# Patient Record
Sex: MALE | Race: WHITE | NOT HISPANIC OR LATINO | Employment: FULL TIME | URBAN - METROPOLITAN AREA
[De-identification: names, ages, dates, MRNs, and addresses within clinical notes are randomized per-mention and may not be internally consistent; named-entity substitution may affect disease eponyms.]

---

## 2018-01-29 ENCOUNTER — TELEPHONE (OUTPATIENT)
Dept: INTERNAL MEDICINE | Facility: CLINIC | Age: 53
End: 2018-01-29

## 2018-01-29 ENCOUNTER — OFFICE VISIT (OUTPATIENT)
Dept: INTERNAL MEDICINE | Facility: CLINIC | Age: 53
End: 2018-01-29
Payer: COMMERCIAL

## 2018-01-29 VITALS
DIASTOLIC BLOOD PRESSURE: 80 MMHG | SYSTOLIC BLOOD PRESSURE: 100 MMHG | HEART RATE: 69 BPM | HEIGHT: 74 IN | BODY MASS INDEX: 29.64 KG/M2 | WEIGHT: 230.94 LBS

## 2018-01-29 DIAGNOSIS — Z00.00 ROUTINE GENERAL MEDICAL EXAMINATION AT A HEALTH CARE FACILITY: Primary | ICD-10-CM

## 2018-01-29 DIAGNOSIS — I10 ESSENTIAL HYPERTENSION: ICD-10-CM

## 2018-01-29 DIAGNOSIS — D68.51 FACTOR V LEIDEN: Primary | ICD-10-CM

## 2018-01-29 DIAGNOSIS — D68.51 FACTOR V LEIDEN: ICD-10-CM

## 2018-01-29 DIAGNOSIS — E78.00 HYPERCHOLESTEROLEMIA: ICD-10-CM

## 2018-01-29 PROCEDURE — 99386 PREV VISIT NEW AGE 40-64: CPT | Mod: S$GLB,,, | Performed by: INTERNAL MEDICINE

## 2018-01-29 PROCEDURE — 99999 PR PBB SHADOW E&M-NEW PATIENT-LVL III: CPT | Mod: PBBFAC,,, | Performed by: INTERNAL MEDICINE

## 2018-01-29 RX ORDER — RIVAROXABAN 20 MG/1
TABLET, FILM COATED ORAL
Qty: 90 TABLET | Refills: 3 | Status: SHIPPED | OUTPATIENT
Start: 2018-01-29 | End: 2018-10-24 | Stop reason: SDUPTHER

## 2018-01-29 RX ORDER — RIVAROXABAN 20 MG/1
TABLET, FILM COATED ORAL
Refills: 1 | COMMUNITY
Start: 2017-12-26 | End: 2018-01-29 | Stop reason: SDUPTHER

## 2018-01-29 RX ORDER — ATORVASTATIN CALCIUM 10 MG/1
TABLET, FILM COATED ORAL
COMMUNITY
Start: 2018-01-23 | End: 2019-02-25 | Stop reason: SDUPTHER

## 2018-01-29 RX ORDER — IRBESARTAN 75 MG/1
TABLET ORAL
COMMUNITY
Start: 2018-01-23 | End: 2019-01-25 | Stop reason: SDUPTHER

## 2018-01-29 NOTE — PROGRESS NOTES
"  HPI:  Anselmo Nava is a 52 y.o. year old male that  presents with   Chief Complaint   Patient presents with    Annual Exam     New Patient   .       No past medical history on file.  Social History     Social History    Marital status:      Spouse name: N/A    Number of children: N/A    Years of education: N/A     Occupational History    Not on file.     Social History Main Topics    Smoking status: Never Smoker    Smokeless tobacco: Never Used    Alcohol use Yes    Drug use: No    Sexual activity: Yes     Partners: Female     Other Topics Concern    Not on file     Social History Narrative    No narrative on file     No past surgical history on file.  No family history on file.        Patient known to me from the [facility].  Patient has factor V Leiden.  He sees a hematologist on a regular basis.  Patient is overdue for lab work.  Patient is up-to-date on colonoscopy having it done within the past 2 years.  Patient has episodes with palpitations.  These episodes seem to be the day after consuming a significant amount of alcohol.  Discussed with patient that this is likely the cause.  Patient had actually even gone to the emergency department and had full workup including tilt table and other evaluation.  He is told that it may be related to anxiety.        The patient is not eligible for Health Maintenance    Review of Systems  Review of Systems   Constitutional: Negative for fever.   Respiratory: Negative for shortness of breath.    Cardiovascular: Negative for chest pain.   Gastrointestinal: Negative for abdominal pain.         Physical Exam:  /80 (BP Location: Right arm, Patient Position: Sitting, BP Method: Medium (Manual))   Pulse 69   Ht 6' 2" (1.88 m)   Wt 104.8 kg (230 lb 14.9 oz)   BMI 29.65 kg/m²   Physical Exam  Vital Signs: Reviewed  General:  No apparent distress  Head:  No signs of head trauma  Eyes:  Pupils are equal.  Extraocular motions intact.  Normal " conjunctiva.  Ears:  Hearing grossly intact.  Neck:  Supple.  No carotid bruit.  No thyromegaly.  Chest:  Clear breath sounds bilaterally.  No wheezes bilaterally.  Non-labored respirations.  Symmetrical expansion.  Cardiac:  Normal rate.  Normal rhythmn.  S1 and S2.  Musculoskeletal:  Normal gait.  Extremities without clubbing or cyanosis.  Neuro:  Alert & oriented X 3.  Speech normal.   Follows commands.  Psychiatric:  Mood normal.  Skin:  No obvious rash or lesions.    LABS:    No results found for this or any previous visit (from the past 2016 hour(s)).    Imaging:  Imaging Results    None           Assessment:    ICD-10-CM ICD-9-CM    1. Routine general medical examination at a health care facility Z00.00 V70.0 Comprehensive metabolic panel      Lipid panel      TSH      URINALYSIS      PSA, Screening   2. Essential hypertension I10 401.9 URINALYSIS   3. Factor V Leiden D68.51 289.81    4. Hypercholesterolemia E78.00 272.0 Comprehensive metabolic panel      Lipid panel      TSH     The primary encounter diagnosis was Routine general medical examination at a health care facility. Diagnoses of Essential hypertension, Factor V Leiden, and Hypercholesterolemia were also pertinent to this visit.      Plan:  Orders Placed This Encounter   Procedures    Comprehensive metabolic panel    Lipid panel    TSH    URINALYSIS    PSA, Screening           Noel De Jesus MD

## 2018-01-29 NOTE — TELEPHONE ENCOUNTER
----- Message from Noel De Jesus MD sent at 1/29/2018  3:17 PM CST -----  He called and stated he needed auth for the Xarelto, can we get that done?  DX is Factor V Leiden    thanks

## 2018-01-31 ENCOUNTER — LAB VISIT (OUTPATIENT)
Dept: LAB | Facility: OTHER | Age: 53
End: 2018-01-31
Attending: INTERNAL MEDICINE
Payer: COMMERCIAL

## 2018-01-31 DIAGNOSIS — E78.00 HYPERCHOLESTEROLEMIA: ICD-10-CM

## 2018-01-31 DIAGNOSIS — Z00.00 ROUTINE GENERAL MEDICAL EXAMINATION AT A HEALTH CARE FACILITY: ICD-10-CM

## 2018-01-31 LAB
ALBUMIN SERPL BCP-MCNC: 4 G/DL
ALP SERPL-CCNC: 67 U/L
ALT SERPL W/O P-5'-P-CCNC: 28 U/L
ANION GAP SERPL CALC-SCNC: 9 MMOL/L
AST SERPL-CCNC: 27 U/L
BILIRUB SERPL-MCNC: 0.8 MG/DL
BUN SERPL-MCNC: 10 MG/DL
CALCIUM SERPL-MCNC: 9.7 MG/DL
CHLORIDE SERPL-SCNC: 105 MMOL/L
CHOLEST SERPL-MCNC: 181 MG/DL
CHOLEST/HDLC SERPL: 2.4 {RATIO}
CO2 SERPL-SCNC: 30 MMOL/L
COMPLEXED PSA SERPL-MCNC: 0.69 NG/ML
CREAT SERPL-MCNC: 0.9 MG/DL
EST. GFR  (AFRICAN AMERICAN): >60 ML/MIN/1.73 M^2
EST. GFR  (NON AFRICAN AMERICAN): >60 ML/MIN/1.73 M^2
GLUCOSE SERPL-MCNC: 109 MG/DL
HDLC SERPL-MCNC: 74 MG/DL
HDLC SERPL: 40.9 %
LDLC SERPL CALC-MCNC: 88.2 MG/DL
NONHDLC SERPL-MCNC: 107 MG/DL
POTASSIUM SERPL-SCNC: 4.4 MMOL/L
PROT SERPL-MCNC: 7 G/DL
SODIUM SERPL-SCNC: 144 MMOL/L
TRIGL SERPL-MCNC: 94 MG/DL
TSH SERPL DL<=0.005 MIU/L-ACNC: 2.19 UIU/ML

## 2018-01-31 PROCEDURE — 84153 ASSAY OF PSA TOTAL: CPT

## 2018-01-31 PROCEDURE — 80053 COMPREHEN METABOLIC PANEL: CPT

## 2018-01-31 PROCEDURE — 36415 COLL VENOUS BLD VENIPUNCTURE: CPT

## 2018-01-31 PROCEDURE — 80061 LIPID PANEL: CPT

## 2018-01-31 PROCEDURE — 84443 ASSAY THYROID STIM HORMONE: CPT

## 2018-05-18 DIAGNOSIS — Z12.11 COLON CANCER SCREENING: ICD-10-CM

## 2018-05-18 DIAGNOSIS — Z11.59 NEED FOR HEPATITIS C SCREENING TEST: ICD-10-CM

## 2018-05-31 ENCOUNTER — TELEPHONE (OUTPATIENT)
Dept: INTERNAL MEDICINE | Facility: CLINIC | Age: 53
End: 2018-05-31

## 2018-05-31 NOTE — TELEPHONE ENCOUNTER
"----- Message from Tangela Sahu sent at 5/31/2018  3:59 PM CDT -----  Contact: Candice Nava Spouse             Name of Who is Calling: Candice Nava Spouse       What is the request in detail: Pt wife says that she calling to follow up on phone call during this past weekend with Dr. De Jesus regarding pt having the same issues and would like to know if pt should be seen by someone.  Pt wife is "only wanting to speak with Dr. De Jesus and don't want to repeat the same information with someone new".  Please contact pt wife to further discuss and advise.    [facility]    What Number to Call Back if not in [facility]: [phone]                                        "

## 2018-05-31 NOTE — TELEPHONE ENCOUNTER
Pt wife only want to speak to Dr. De Jesus denied to give me any information, I informed pt wife that doctor is not in clinic and due to the time her phone call wont be returned until next clinic day. Offer to send message to another provider pt wife denied that option. Also informed pt wife that if she unable to wait for an called back she can  go to ED or UC. Pt wife stated that she will reach out to doctor on his cell phone.

## 2018-06-01 ENCOUNTER — LAB VISIT (OUTPATIENT)
Dept: LAB | Facility: OTHER | Age: 53
End: 2018-06-01
Attending: INTERNAL MEDICINE
Payer: COMMERCIAL

## 2018-06-01 ENCOUNTER — OFFICE VISIT (OUTPATIENT)
Dept: INTERNAL MEDICINE | Facility: CLINIC | Age: 53
End: 2018-06-01
Attending: INTERNAL MEDICINE
Payer: COMMERCIAL

## 2018-06-01 VITALS
SYSTOLIC BLOOD PRESSURE: 134 MMHG | HEART RATE: 80 BPM | HEIGHT: 74 IN | DIASTOLIC BLOOD PRESSURE: 82 MMHG | WEIGHT: 226.44 LBS | BODY MASS INDEX: 29.06 KG/M2

## 2018-06-01 DIAGNOSIS — F32.A ANXIETY AND DEPRESSION: ICD-10-CM

## 2018-06-01 DIAGNOSIS — R41.0 CONFUSION: ICD-10-CM

## 2018-06-01 DIAGNOSIS — R45.851 SUICIDAL THOUGHTS: ICD-10-CM

## 2018-06-01 DIAGNOSIS — F41.9 ANXIETY AND DEPRESSION: ICD-10-CM

## 2018-06-01 DIAGNOSIS — Z11.59 NEED FOR HEPATITIS C SCREENING TEST: ICD-10-CM

## 2018-06-01 DIAGNOSIS — R41.0 CONFUSION: Primary | ICD-10-CM

## 2018-06-01 LAB
ALBUMIN SERPL BCP-MCNC: 4.2 G/DL
ALP SERPL-CCNC: 57 U/L
ALT SERPL W/O P-5'-P-CCNC: 35 U/L
ANION GAP SERPL CALC-SCNC: 9 MMOL/L
AST SERPL-CCNC: 29 U/L
BILIRUB SERPL-MCNC: 0.7 MG/DL
BUN SERPL-MCNC: 16 MG/DL
CALCIUM SERPL-MCNC: 9.9 MG/DL
CHLORIDE SERPL-SCNC: 105 MMOL/L
CO2 SERPL-SCNC: 26 MMOL/L
CREAT SERPL-MCNC: 1 MG/DL
EST. GFR  (AFRICAN AMERICAN): >60 ML/MIN/1.73 M^2
EST. GFR  (NON AFRICAN AMERICAN): >60 ML/MIN/1.73 M^2
FOLATE SERPL-MCNC: 16 NG/ML
GLUCOSE SERPL-MCNC: 110 MG/DL
POTASSIUM SERPL-SCNC: 4 MMOL/L
PROT SERPL-MCNC: 7.3 G/DL
SODIUM SERPL-SCNC: 140 MMOL/L
TSH SERPL DL<=0.005 MIU/L-ACNC: 1.24 UIU/ML
VIT B12 SERPL-MCNC: >2000 PG/ML

## 2018-06-01 PROCEDURE — 84425 ASSAY OF VITAMIN B-1: CPT

## 2018-06-01 PROCEDURE — 99214 OFFICE O/P EST MOD 30 MIN: CPT | Mod: S$GLB,,, | Performed by: INTERNAL MEDICINE

## 2018-06-01 PROCEDURE — 82607 VITAMIN B-12: CPT

## 2018-06-01 PROCEDURE — 82746 ASSAY OF FOLIC ACID SERUM: CPT

## 2018-06-01 PROCEDURE — 3079F DIAST BP 80-89 MM HG: CPT | Mod: CPTII,S$GLB,, | Performed by: INTERNAL MEDICINE

## 2018-06-01 PROCEDURE — 80053 COMPREHEN METABOLIC PANEL: CPT

## 2018-06-01 PROCEDURE — 84443 ASSAY THYROID STIM HORMONE: CPT

## 2018-06-01 PROCEDURE — 86803 HEPATITIS C AB TEST: CPT

## 2018-06-01 PROCEDURE — 99999 PR PBB SHADOW E&M-EST. PATIENT-LVL III: CPT | Mod: PBBFAC,,, | Performed by: INTERNAL MEDICINE

## 2018-06-01 PROCEDURE — 3008F BODY MASS INDEX DOCD: CPT | Mod: CPTII,S$GLB,, | Performed by: INTERNAL MEDICINE

## 2018-06-01 PROCEDURE — 3075F SYST BP GE 130 - 139MM HG: CPT | Mod: CPTII,S$GLB,, | Performed by: INTERNAL MEDICINE

## 2018-06-01 RX ORDER — ESCITALOPRAM OXALATE 10 MG/1
10 TABLET ORAL DAILY
Qty: 90 TABLET | Refills: 1 | Status: SHIPPED | OUTPATIENT
Start: 2018-06-01 | End: 2018-11-20 | Stop reason: SDUPTHER

## 2018-06-01 RX ORDER — HYDROXYZINE HYDROCHLORIDE 25 MG/1
25 TABLET, FILM COATED ORAL 3 TIMES DAILY
Qty: 30 TABLET | Refills: 0 | Status: SHIPPED | OUTPATIENT
Start: 2018-06-01 | End: 2020-01-30

## 2018-06-01 NOTE — PROGRESS NOTES
"Subjective:       Patient ID: Anselmo Nava is a 52 y.o. male.    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 It was the end of his son's baseball career. He admits to drinking more EtOH than normal during the entire week. He found himself to be quite emotional on the drive home finding himself having uncontrollable crying. He reports a 24-36 hour period of sucicidal thoughts. He reports intrusive thoughts such as "what if I went to [employer] ... and shot myself" or "drove my car off this bridge" He denies any similar previous thoughts. He denies continuation of symptoms or similar thoughts. He is quite surprised by these uncontrollable thoughts and feels that these are very uncharacteristic of his mood or thought process. He admits to drinking EtOH most days of the week but denies underlying anxiety or depressive symptoms that he may be self medicating. He reports an episode following episode of suicidal thoughts were he was tying a tie and did the first step but became confused and had his sone tie his tie for him. He denies focal motor weakness, tremors, blurry vision, myelopathic symptoms. He denies preceding memory loss, rigidity, tremors, difficulty with gait, hallucinations. He denies any family members reporting to him any type of change in personality or behavior prior to this event. Family hx of Aunt who committed suicide. Has upcoming appt with Dr. Chen            Review of Systems    Objective:      Vitals:    06/01/18 1056   BP: 134/82   Pulse: 80   Weight: 102.7 kg (226 lb 6.6 oz)   Height: 6' 2" (1.88 m)      Physical Exam   Constitutional: He is oriented to person, place, and time. He appears well-developed and well-nourished. No distress.   HENT:   Head: Normocephalic and atraumatic.   Mouth/Throat: Oropharynx is clear and moist. No oropharyngeal exudate.   Eyes: Conjunctivae and EOM are " normal. Pupils are equal, round, and reactive to light. No scleral icterus.   Neck: No thyromegaly present.   Cardiovascular: Normal rate, regular rhythm and normal heart sounds.    No murmur heard.  Pulmonary/Chest: Effort normal and breath sounds normal. He has no wheezes. He has no rales.   Abdominal: Soft. He exhibits no distension. There is no tenderness.   Musculoskeletal: He exhibits no edema or tenderness.   Lymphadenopathy:     He has no cervical adenopathy.   Neurological: He is alert and oriented to person, place, and time. He has normal strength. No cranial nerve deficit or sensory deficit.   FTN intact. No tremor of outstretched hands. No pronator drift.   Skin: Skin is warm and dry.   Psychiatric: He has a normal mood and affect. His behavior is normal. His speech is not rapid and/or pressured, not tangential and not slurred. He is not agitated, not withdrawn and not actively hallucinating. He does not exhibit a depressed mood. He expresses no homicidal and no suicidal ideation.       Assessment:       1. Confusion    2. Anxiety and depression    3. Suicidal thoughts        Plan:       Anselmo was seen today for anxiety.    Diagnoses and all orders for this visit:    Confusion  -     Vitamin B1; Future  -     Comprehensive metabolic panel; Future  -     TSH; Future  -     Folate; Future  -     Vitamin B12; Future    Anxiety and depression    Suicidal thoughts  -no longer having these thoughts. Was involving a fairly finite time period. Suspect episode brought on by excessive EtOH intake. Continued regular use discussed, especially inlight of these symptoms.  -     hydrOXYzine HCl (ATARAX) 25 MG tablet; Take 1 tablet (25 mg total) by mouth 3 (three) times daily.  -     escitalopram oxalate (LEXAPRO) 10 MG tablet; Take 1 tablet (10 mg total) by mouth once daily.    Addendum (6/22/18 @6201)  Attempted to contact pt to see how he was doing with SI, SSRI, if able tog et psychiatry appt, etc. Per voicemail  he is on vacation. LVM to contact office.         F/U with PCP in 8-12 or sooner prn.      Side effects of medication(s) were discussed in detail and patient voiced understanding.  Patient will call back for any issues or complications.

## 2018-06-04 LAB — HCV AB SERPL QL IA: NEGATIVE

## 2018-06-05 LAB — VIT B1 SERPL-MCNC: 55 UG/L (ref 38–122)

## 2018-06-07 ENCOUNTER — TELEPHONE (OUTPATIENT)
Dept: INTERNAL MEDICINE | Facility: CLINIC | Age: 53
End: 2018-06-07

## 2018-06-14 ENCOUNTER — TELEPHONE (OUTPATIENT)
Dept: INTERNAL MEDICINE | Facility: CLINIC | Age: 53
End: 2018-06-14

## 2018-06-14 NOTE — TELEPHONE ENCOUNTER
lvm for pt to call office back in regards of   Canceling 8/6/18 since dr. De Jesus no longer have clinic.  need to reschedule pt an appt with an new provider as a new pt.

## 2018-07-30 ENCOUNTER — TELEPHONE (OUTPATIENT)
Dept: INTERNAL MEDICINE | Facility: CLINIC | Age: 53
End: 2018-07-30

## 2018-07-30 NOTE — TELEPHONE ENCOUNTER
lvm for pt to call office back in regards of   To reschedule pt appt provider will be out clinic on 8/7/18

## 2018-08-09 ENCOUNTER — PATIENT OUTREACH (OUTPATIENT)
Dept: ADMINISTRATIVE | Facility: HOSPITAL | Age: 53
End: 2018-08-09

## 2018-08-09 NOTE — PROGRESS NOTES
[facility] is committed to your overall health.  To help you get the most out of each of your visits, we will review your information to make sure you are up to date on all of your recommended tests and/or procedures.       Your PCP    found that you may be due for:       Health Maintenance Due   Topic Date Due    TETANUS VACCINE  06/21/1982    Colonoscopy  06/21/2014    Influenza Vaccine  08/01/2018             If you have had any of the above done at another facility, please bring the records or information with you so that your record at [facility] will be complete.  If you would like to schedule any of these, please contact me.     If you are currently taking medication, please bring it with you to your appointment for review.     Also, if you have any type of Advanced Directives, please bring them with you to your office visit so we may scan them into your chart.       Thank you for Choosing [facility] for your healthcare needs.        Additional Information  If you have questions, you can email [email or web address] or call [phone]  to talk to our [facility] staff. Remember, [facility] is NOT to be used for urgent needs. For medical emergencies, dial 911.

## 2018-08-21 ENCOUNTER — OFFICE VISIT (OUTPATIENT)
Dept: INTERNAL MEDICINE | Facility: CLINIC | Age: 53
End: 2018-08-21
Attending: INTERNAL MEDICINE
Payer: COMMERCIAL

## 2018-08-21 VITALS
BODY MASS INDEX: 29.79 KG/M2 | DIASTOLIC BLOOD PRESSURE: 84 MMHG | SYSTOLIC BLOOD PRESSURE: 104 MMHG | OXYGEN SATURATION: 97 % | HEART RATE: 62 BPM | WEIGHT: 232.13 LBS | HEIGHT: 74 IN

## 2018-08-21 DIAGNOSIS — F32.A ANXIETY AND DEPRESSION: ICD-10-CM

## 2018-08-21 DIAGNOSIS — R45.851 SUICIDAL THOUGHTS: ICD-10-CM

## 2018-08-21 DIAGNOSIS — Z86.711 HISTORY OF PULMONARY EMBOLUS (PE): ICD-10-CM

## 2018-08-21 DIAGNOSIS — D68.51 FACTOR V LEIDEN: ICD-10-CM

## 2018-08-21 DIAGNOSIS — E78.00 HYPERCHOLESTEROLEMIA: ICD-10-CM

## 2018-08-21 DIAGNOSIS — Z23 NEED FOR TDAP VACCINATION: Primary | ICD-10-CM

## 2018-08-21 DIAGNOSIS — I10 ESSENTIAL HYPERTENSION: ICD-10-CM

## 2018-08-21 DIAGNOSIS — F41.9 ANXIETY AND DEPRESSION: ICD-10-CM

## 2018-08-21 PROCEDURE — 99999 PR PBB SHADOW E&M-EST. PATIENT-LVL IV: CPT | Mod: PBBFAC,,, | Performed by: INTERNAL MEDICINE

## 2018-08-21 PROCEDURE — 3008F BODY MASS INDEX DOCD: CPT | Mod: CPTII,S$GLB,, | Performed by: INTERNAL MEDICINE

## 2018-08-21 PROCEDURE — 3074F SYST BP LT 130 MM HG: CPT | Mod: CPTII,S$GLB,, | Performed by: INTERNAL MEDICINE

## 2018-08-21 PROCEDURE — 3079F DIAST BP 80-89 MM HG: CPT | Mod: CPTII,S$GLB,, | Performed by: INTERNAL MEDICINE

## 2018-08-21 PROCEDURE — 99214 OFFICE O/P EST MOD 30 MIN: CPT | Mod: S$GLB,,, | Performed by: INTERNAL MEDICINE

## 2018-08-21 NOTE — PROGRESS NOTES
Subjective:       Patient ID: Anselmo Nava is a 53 y.o. male.    Chief Complaint: Follow-up    Here to establish care    No more having episodes of increased stress and worry. He denies SI/HI. He denies any additional episodes of confusion.     Factor V leiden- Diagnosed PE about 5 years prior and sounds like he had thrombolysis done. Follows with cardiologist Princess twice a year along with hematologist jim Simental. On xarelto. Denies bleeding complications. Does report easy bruising but is not bothersome.     HM  Colonoscopy at age 49 with rec to f/u with ENT                Review of Systems   Constitutional: Negative for appetite change, chills, fever and unexpected weight change.   HENT: Negative for hearing loss, sore throat and trouble swallowing.    Eyes: Negative for visual disturbance.   Respiratory: Negative for cough, chest tightness and shortness of breath.    Cardiovascular: Negative for chest pain and leg swelling.   Gastrointestinal: Negative for abdominal pain, blood in stool, constipation, diarrhea, nausea and vomiting.   Endocrine: Negative for polydipsia and polyuria.   Genitourinary: Negative for decreased urine volume, difficulty urinating, dysuria, frequency and urgency.   Musculoskeletal: Negative for gait problem.   Skin: Negative for rash.   Neurological: Negative for dizziness and numbness.   Psychiatric/Behavioral: The patient is not nervous/anxious.        Past Medical History:   Diagnosis Date    Anxiety      History reviewed. No pertinent surgical history.  Family History   Problem Relation Age of Onset    Lymphoma Mother     Depression Sister      Social History     Socioeconomic History    Marital status:      Spouse name: Not on file    Number of children: Not on file    Years of education: Not on file    Highest education level: Not on file   Social Needs    Financial resource strain: Not on file    Food insecurity - worry: Not on file    Food insecurity -  "inability: Not on file    Transportation needs - medical: Not on file    Transportation needs - non-medical: Not on file   Occupational History    Not on file   Tobacco Use    Smoking status: Never Smoker    Smokeless tobacco: Never Used   Substance and Sexual Activity    Alcohol use: Yes    Drug use: No    Sexual activity: Yes     Partners: Female   Other Topics Concern    Not on file   Social History Narrative    Not on file         Objective:      Vitals:    08/21/18 1104   BP: 104/84   Pulse: 62   SpO2: 97%   Weight: 105.3 kg (232 lb 2.3 oz)   Height: 6' 2" (1.88 m)      Physical Exam   Constitutional: He is oriented to person, place, and time. He appears well-developed and well-nourished. No distress.   HENT:   Head: Normocephalic and atraumatic.   Mouth/Throat: Oropharynx is clear and moist. No oropharyngeal exudate.   Eyes: Conjunctivae and EOM are normal. Pupils are equal, round, and reactive to light. No scleral icterus.   Neck: No thyromegaly present.   Cardiovascular: Normal rate, regular rhythm and normal heart sounds.   No murmur heard.  Pulmonary/Chest: Effort normal and breath sounds normal. He has no wheezes. He has no rales.   Abdominal: Soft. He exhibits no distension. There is no tenderness.   Musculoskeletal: He exhibits no edema or tenderness.   Lymphadenopathy:     He has no cervical adenopathy.   Neurological: He is alert and oriented to person, place, and time.   Skin: Skin is warm and dry.   Psychiatric: He has a normal mood and affect. His behavior is normal.       Assessment:       1. Need for Tdap vaccination    2. Suicidal thoughts    3. Anxiety and depression    4. Essential hypertension    5. Factor V Leiden    6. Hypercholesterolemia    7. History of pulmonary embolus (PE)        Plan:       Anselmo was seen today for follow-up.    Diagnoses and all orders for this visit:    Need for Tdap vaccination  Pt given paper Rx to take to our pharmacy down stairs for following " vaccinations:     Suicidal thoughts  Resolved. Isolated episode. Will monitor  Anxiety and depression  Continue lexpro.   Essential hypertension  controlled. Continue current regimen    Factor V Leiden   F/u with heme and continue xarelto  Hypercholesterolemia   Tolerating statin  History of pulmonary embolus (PE)    RTC in 6 months or sooner prn           Side effects of medication(s) were discussed in detail and patient voiced understanding.  Patient will call back for any issues or complications.

## 2018-08-28 ENCOUNTER — DOCUMENTATION ONLY (OUTPATIENT)
Dept: ADMINISTRATIVE | Facility: HOSPITAL | Age: 53
End: 2018-08-28

## 2018-08-28 NOTE — PROGRESS NOTES
PROGRESS NOTES RECEIVED FROM [facility], SCANNED INTO PATIENT CHART CAN BE VIEWED UNDER MEDIA TAB.

## 2018-10-24 DIAGNOSIS — D68.51 FACTOR V LEIDEN: ICD-10-CM

## 2018-11-20 RX ORDER — ESCITALOPRAM OXALATE 10 MG/1
TABLET ORAL
Qty: 90 TABLET | Refills: 2 | Status: SHIPPED | OUTPATIENT
Start: 2018-11-20 | End: 2019-08-21 | Stop reason: SDUPTHER

## 2019-01-15 ENCOUNTER — PATIENT OUTREACH (OUTPATIENT)
Dept: ADMINISTRATIVE | Facility: HOSPITAL | Age: 54
End: 2019-01-15

## 2019-01-15 NOTE — PROGRESS NOTES
Dear Marshall Magee [facility] is committed to your overall health and would like to ensure that you are up to date on all of your health maintenance testing. Our records indicate that you are overdue for your colorectal cancer screening. After reviewing your chart, it has been documented that a FIT KIT (colorectal cancer screening kit) was given at a clinic visit or mailed to you, but the lab has yet to receive the kit so that we may update your chart. This is a friendly reminder to complete this test (the instructions will tell you how) and then return your sample in the postage-paid return envelope within 24 hours of collection or return to ANY [facility] Facility . Please remember to put the collection date on your sample!    If your test results are negative, you won't need testing again for another year. If results show you need more testing, we will call you with next steps.  Also, if you have misplaced the FITKIT let us know and we will mail you another one.  Sincerely,        Mickey Anton MD and your [facility] Team

## 2019-01-15 NOTE — PROGRESS NOTES
[facility] is committed to your overall health.  To help you get the most out of each of your visits, we will review your information to make sure you are up to date on all of your recommended tests and/or procedures.       Your PCP  Mickey Anton MD   found that you may be due for:       Health Maintenance Due   Topic Date Due    TETANUS VACCINE  06/21/1982    Colonoscopy  06/21/2014    Influenza Vaccine  08/01/2018             If you have had any of the above done at another facility, please bring the records or information with you so that your record at [facility] will be complete.  If you would like to schedule any of these, please contact me.     If you are currently taking medication, please bring it with you to your appointment for review.     Also, if you have any type of Advanced Directives, please bring them with you to your office visit so we may scan them into your chart.       Thank you for Choosing [facility] for your healthcare needs.        Additional Information  If you have questions, you can email [email or web address] or call [phone]  to talk to our [facility] staff. Remember, [employer] is NOT to be used for urgent needs. For medical emergencies, dial 911.

## 2019-01-25 RX ORDER — IRBESARTAN 75 MG/1
TABLET ORAL
Qty: 90 TABLET | Refills: 2 | Status: SHIPPED | OUTPATIENT
Start: 2019-01-25 | End: 2019-03-28

## 2019-01-29 ENCOUNTER — OFFICE VISIT (OUTPATIENT)
Dept: INTERNAL MEDICINE | Facility: CLINIC | Age: 54
End: 2019-01-29
Attending: INTERNAL MEDICINE
Payer: COMMERCIAL

## 2019-01-29 ENCOUNTER — LAB VISIT (OUTPATIENT)
Dept: LAB | Facility: OTHER | Age: 54
End: 2019-01-29
Attending: INTERNAL MEDICINE
Payer: COMMERCIAL

## 2019-01-29 VITALS
OXYGEN SATURATION: 96 % | WEIGHT: 237.88 LBS | SYSTOLIC BLOOD PRESSURE: 118 MMHG | HEART RATE: 78 BPM | DIASTOLIC BLOOD PRESSURE: 72 MMHG | HEIGHT: 74 IN | BODY MASS INDEX: 30.53 KG/M2

## 2019-01-29 DIAGNOSIS — E78.00 HYPERCHOLESTEROLEMIA: ICD-10-CM

## 2019-01-29 DIAGNOSIS — D68.51 FACTOR V LEIDEN: ICD-10-CM

## 2019-01-29 DIAGNOSIS — Z00.00 ANNUAL PHYSICAL EXAM: Primary | ICD-10-CM

## 2019-01-29 DIAGNOSIS — Z23 NEED FOR TDAP VACCINATION: ICD-10-CM

## 2019-01-29 DIAGNOSIS — Z12.5 PROSTATE CANCER SCREENING: ICD-10-CM

## 2019-01-29 DIAGNOSIS — Z00.00 ANNUAL PHYSICAL EXAM: ICD-10-CM

## 2019-01-29 DIAGNOSIS — Z12.12 SCREENING FOR COLORECTAL CANCER: ICD-10-CM

## 2019-01-29 DIAGNOSIS — Z12.11 SCREENING FOR COLORECTAL CANCER: ICD-10-CM

## 2019-01-29 DIAGNOSIS — I10 ESSENTIAL HYPERTENSION: ICD-10-CM

## 2019-01-29 LAB
ALBUMIN SERPL BCP-MCNC: 3.9 G/DL
ALP SERPL-CCNC: 65 U/L
ALT SERPL W/O P-5'-P-CCNC: 23 U/L
ANION GAP SERPL CALC-SCNC: 5 MMOL/L
AST SERPL-CCNC: 28 U/L
BASOPHILS # BLD AUTO: 0.02 K/UL
BASOPHILS NFR BLD: 0.5 %
BILIRUB SERPL-MCNC: 0.7 MG/DL
BUN SERPL-MCNC: 12 MG/DL
CALCIUM SERPL-MCNC: 9.4 MG/DL
CHLORIDE SERPL-SCNC: 103 MMOL/L
CHOLEST SERPL-MCNC: 188 MG/DL
CHOLEST/HDLC SERPL: 2.3 {RATIO}
CO2 SERPL-SCNC: 31 MMOL/L
COMPLEXED PSA SERPL-MCNC: 0.59 NG/ML
CREAT SERPL-MCNC: 0.9 MG/DL
DIFFERENTIAL METHOD: ABNORMAL
EOSINOPHIL # BLD AUTO: 0.1 K/UL
EOSINOPHIL NFR BLD: 1.9 %
ERYTHROCYTE [DISTWIDTH] IN BLOOD BY AUTOMATED COUNT: 14 %
EST. GFR  (AFRICAN AMERICAN): >60 ML/MIN/1.73 M^2
EST. GFR  (NON AFRICAN AMERICAN): >60 ML/MIN/1.73 M^2
ESTIMATED AVG GLUCOSE: 117 MG/DL
GLUCOSE SERPL-MCNC: 120 MG/DL
HBA1C MFR BLD HPLC: 5.7 %
HCT VFR BLD AUTO: 44.1 %
HDLC SERPL-MCNC: 82 MG/DL
HDLC SERPL: 43.6 %
HGB BLD-MCNC: 14.8 G/DL
LDLC SERPL CALC-MCNC: 89.8 MG/DL
LYMPHOCYTES # BLD AUTO: 1.3 K/UL
LYMPHOCYTES NFR BLD: 32.3 %
MCH RBC QN AUTO: 33.3 PG
MCHC RBC AUTO-ENTMCNC: 33.6 G/DL
MCV RBC AUTO: 99 FL
MONOCYTES # BLD AUTO: 0.5 K/UL
MONOCYTES NFR BLD: 12 %
NEUTROPHILS # BLD AUTO: 2.2 K/UL
NEUTROPHILS NFR BLD: 53.1 %
NONHDLC SERPL-MCNC: 106 MG/DL
PLATELET # BLD AUTO: 237 K/UL
PMV BLD AUTO: 11 FL
POTASSIUM SERPL-SCNC: 4.2 MMOL/L
PROT SERPL-MCNC: 6.8 G/DL
RBC # BLD AUTO: 4.44 M/UL
SODIUM SERPL-SCNC: 139 MMOL/L
TRIGL SERPL-MCNC: 81 MG/DL
TSH SERPL DL<=0.005 MIU/L-ACNC: 2.1 UIU/ML
WBC # BLD AUTO: 4.15 K/UL

## 2019-01-29 PROCEDURE — 80053 COMPREHEN METABOLIC PANEL: CPT

## 2019-01-29 PROCEDURE — 3074F SYST BP LT 130 MM HG: CPT | Mod: CPTII,S$GLB,, | Performed by: INTERNAL MEDICINE

## 2019-01-29 PROCEDURE — 84153 ASSAY OF PSA TOTAL: CPT

## 2019-01-29 PROCEDURE — 3078F PR MOST RECENT DIASTOLIC BLOOD PRESSURE < 80 MM HG: ICD-10-PCS | Mod: CPTII,S$GLB,, | Performed by: INTERNAL MEDICINE

## 2019-01-29 PROCEDURE — 83036 HEMOGLOBIN GLYCOSYLATED A1C: CPT

## 2019-01-29 PROCEDURE — 99396 PREV VISIT EST AGE 40-64: CPT | Mod: S$GLB,,, | Performed by: INTERNAL MEDICINE

## 2019-01-29 PROCEDURE — 99999 PR PBB SHADOW E&M-EST. PATIENT-LVL IV: ICD-10-PCS | Mod: PBBFAC,,, | Performed by: INTERNAL MEDICINE

## 2019-01-29 PROCEDURE — 99999 PR PBB SHADOW E&M-EST. PATIENT-LVL IV: CPT | Mod: PBBFAC,,, | Performed by: INTERNAL MEDICINE

## 2019-01-29 PROCEDURE — 3078F DIAST BP <80 MM HG: CPT | Mod: CPTII,S$GLB,, | Performed by: INTERNAL MEDICINE

## 2019-01-29 PROCEDURE — 80061 LIPID PANEL: CPT

## 2019-01-29 PROCEDURE — 36415 COLL VENOUS BLD VENIPUNCTURE: CPT

## 2019-01-29 PROCEDURE — 3074F PR MOST RECENT SYSTOLIC BLOOD PRESSURE < 130 MM HG: ICD-10-PCS | Mod: CPTII,S$GLB,, | Performed by: INTERNAL MEDICINE

## 2019-01-29 PROCEDURE — 84443 ASSAY THYROID STIM HORMONE: CPT

## 2019-01-29 PROCEDURE — 85025 COMPLETE CBC W/AUTO DIFF WBC: CPT

## 2019-01-29 PROCEDURE — 99396 PR PREVENTIVE VISIT,EST,40-64: ICD-10-PCS | Mod: S$GLB,,, | Performed by: INTERNAL MEDICINE

## 2019-01-29 NOTE — PROGRESS NOTES
"Subjective:       Patient ID: Anselmo Nava is a 53 y.o. male.    Chief Complaint: Follow-up    Here for annual exam    Patient presents today for routine evaluation, physical, and labs. Patient has no major concerns or complaints today.     Mood well controlled with low dose escitalopram.     On ACEi. We will change this.    Pt is tolerating statin without frequent muscle cramps or diffuse myalgias or weakness.    No bleeding complications on xarelto        Review of Systems   Constitutional: Negative for appetite change, chills, fever and unexpected weight change.   HENT: Negative for hearing loss, sore throat and trouble swallowing.    Eyes: Negative for visual disturbance.   Respiratory: Negative for cough, chest tightness and shortness of breath.    Cardiovascular: Negative for chest pain and leg swelling.   Gastrointestinal: Negative for abdominal pain, blood in stool, constipation, diarrhea, nausea and vomiting.   Endocrine: Negative for polydipsia and polyuria.   Genitourinary: Negative for decreased urine volume, difficulty urinating, dysuria, frequency and urgency.   Musculoskeletal: Negative for gait problem.   Skin: Negative for rash.   Neurological: Negative for dizziness and numbness.   Psychiatric/Behavioral: The patient is not nervous/anxious.        Objective:      Vitals:    01/29/19 0821   BP: 118/72   Pulse: 78   SpO2: 96%   Weight: 107.9 kg (237 lb 14 oz)   Height: 6' 2" (1.88 m)      Physical Exam   Constitutional: He is oriented to person, place, and time. He appears well-developed and well-nourished. No distress.   HENT:   Head: Normocephalic and atraumatic.   Mouth/Throat: Oropharynx is clear and moist. No oropharyngeal exudate.   Eyes: Conjunctivae and EOM are normal. Pupils are equal, round, and reactive to light. No scleral icterus.   Neck: No thyromegaly present.   Cardiovascular: Normal rate, regular rhythm and normal heart sounds.   No murmur heard.  Pulmonary/Chest: Effort normal and " "breath sounds normal. He has no wheezes. He has no rales.   Abdominal: Soft. He exhibits no distension. There is no tenderness.   Musculoskeletal: He exhibits no edema or tenderness.   Lymphadenopathy:     He has no cervical adenopathy.   Neurological: He is alert and oriented to person, place, and time.   Skin: Skin is warm and dry.   Psychiatric: He has a normal mood and affect. His behavior is normal.       Assessment:       1. Annual physical exam    2. Essential hypertension    3. Need for Tdap vaccination    4. Screening for colorectal cancer    5. Prostate cancer screening    6. Factor V Leiden    7. Hypercholesterolemia        Plan:       Anselmo was seen today for follow-up.    Diagnoses and all orders for this visit:    Annual physical exam  -     Comprehensive metabolic panel; Future  -     Lipid panel; Future  -     CBC auto differential; Future  -     TSH; Future  -     Hemoglobin A1c; Future    Essential hypertension   .[email or web address]    Need for Tdap vaccination    Prostate cancer screening  -     PSA, Screening; Future    Factor V Leiden   controlled. Continue current regimen    Hypercholesterolemia  Your LDL or "bad cholesterol" is elevated which places you at increased risk for heart attacks and strokes.  I recommend a low cholesterol, low carbohydrate diet and exercise to achieve weight loss. In addition to his I would like to start you on a medication called a statin, which is very effective at lowering your cholesterol. If you are amenable to this plan please let me know and I will send in a prescription to your pharmacy. If while taking you develop severe, frequent muscle aches please stop the medication and notify me or my staff electronically or by phone.    [facility]rose UTD done approx 3-4 yrs prior by [facility]. Will get records.          RTC in 6 months or sooner prn           Side effects of medication(s) were discussed in detail and patient voiced understanding.  Patient will call back for any issues or " complications.

## 2019-02-13 ENCOUNTER — DOCUMENTATION ONLY (OUTPATIENT)
Dept: INTERNAL MEDICINE | Facility: CLINIC | Age: 54
End: 2019-02-13

## 2019-02-13 NOTE — PROGRESS NOTES
Outside records received and reviewed    [facility] 7/7/15  EGD-ring in lower 1/3 esophagus c/ dilation  Erythema and congestion in antrum and duodenal bulb and irregular Z line consistent with esophagitis   Colonoscopy  3mm polkpy distal descending colon   Rec rpt in 5 yrs.    Please let me know when you would like to schedule this by responding to this message and it will go to my support staff. If you have additional questions or concerns they will forward those on to me.

## 2019-02-25 RX ORDER — ATORVASTATIN CALCIUM 10 MG/1
TABLET, FILM COATED ORAL
Qty: 90 TABLET | Refills: 2 | Status: SHIPPED | OUTPATIENT
Start: 2019-02-25 | End: 2019-11-18 | Stop reason: SDUPTHER

## 2019-02-26 NOTE — TELEPHONE ENCOUNTER
----- Message from Rajinder Bloom sent at 2/26/2019  2:00 PM CST -----  Contact: FARSHAD PLASCENCIA [[id number]]  Name of Who is Calling: FARSHAD PLASCENCIA [[id number]]      What is the request in detail: Patient states he needs a prior authorization for rivaroxaban (XARELTO) 20 mg Tab. Please call [phone]      Can the clinic reply by [facility]: no      What [facility] Back [facility]: [phone]

## 2019-03-20 PROBLEM — K22.2 ESOPHAGEAL RING: Status: ACTIVE | Noted: 2019-03-20

## 2019-03-20 PROBLEM — K64.8 INTERNAL HEMORRHOIDS: Status: ACTIVE | Noted: 2019-03-20

## 2019-03-27 ENCOUNTER — TELEPHONE (OUTPATIENT)
Dept: INTERNAL MEDICINE | Facility: CLINIC | Age: 54
End: 2019-03-27

## 2019-03-27 NOTE — TELEPHONE ENCOUNTER
----- Message from Leatha Cole sent at 3/27/2019  8:41 AM CDT -----  Contact: Pt   Name of Who is Calling: FARSHAD PLASCENCIA [[id number]]      What is the request in detail: Patient is requesting a call from staff, no further details provided for the call. Please contact to further discuss and advise      Can the clinic reply by [facility]: No       What Number to Call Back if not in [facility]: [phone]

## 2019-03-27 NOTE — TELEPHONE ENCOUNTER
----- Message from Jared Diaz sent at 3/27/2019  2:03 PM CDT -----  Contact: FARSHAD PLASCENCIA [[id number]]  Type:  Patient Returning Call    Who Called: FARSHAD PLASCENCIA [[id number]]    Who Left Message for Patient: Taryn Murphy LPN      Does the patient know what this is regarding?: Yes blood pressure medication    Best Call Back Number:[phone]    Additional Information: Pt is requesting to speak with nurse regarding blood pressure medication. Pt states that there has been a recall on one of his recent prescribe meds and and advising on requesting a replacement med. Please advise. Thanks

## 2019-03-28 RX ORDER — VALSARTAN 80 MG/1
80 TABLET ORAL DAILY
Qty: 90 TABLET | Refills: 3 | Status: SHIPPED | OUTPATIENT
Start: 2019-03-28 | End: 2020-03-23

## 2019-03-28 NOTE — TELEPHONE ENCOUNTER
----- Message from Bianka Hodge sent at 3/28/2019 10:20 AM CDT -----  Type:  Patient Returning Call    Who Called: FARSHAD PLASCENCIA [[id number]]      Who Left Message for Patient: Taryn     Does the patient know what this is regarding?:Pt is calling about his medication. This is the 3rd call. Pt states he did not receive a call or voicemail from yesterday     Best Call Back Number:[phone]

## 2019-03-28 NOTE — TELEPHONE ENCOUNTER
lvm notifying pt. That Valsartan sent in to replace recall irbesartan. f/u in 3-4 weeks for nurse visit for BP check. Pt. Can call [phone] to schedule this appointment   Pedro

## 2019-04-08 ENCOUNTER — TELEPHONE (OUTPATIENT)
Dept: INTERNAL MEDICINE | Facility: CLINIC | Age: 54
End: 2019-04-08

## 2019-04-08 NOTE — TELEPHONE ENCOUNTER
Spoke with pt. Regarding xarelto.  Pt. Has another Dr. Refill it. Pt. Would like [facility].

## 2019-07-30 ENCOUNTER — OFFICE VISIT (OUTPATIENT)
Dept: INTERNAL MEDICINE | Facility: CLINIC | Age: 54
End: 2019-07-30
Attending: INTERNAL MEDICINE
Payer: COMMERCIAL

## 2019-07-30 ENCOUNTER — LAB VISIT (OUTPATIENT)
Dept: LAB | Facility: OTHER | Age: 54
End: 2019-07-30
Attending: INTERNAL MEDICINE
Payer: COMMERCIAL

## 2019-07-30 VITALS
HEART RATE: 71 BPM | HEIGHT: 74 IN | SYSTOLIC BLOOD PRESSURE: 120 MMHG | OXYGEN SATURATION: 96 % | BODY MASS INDEX: 30.25 KG/M2 | WEIGHT: 235.69 LBS | DIASTOLIC BLOOD PRESSURE: 74 MMHG

## 2019-07-30 DIAGNOSIS — D68.51 FACTOR V LEIDEN: ICD-10-CM

## 2019-07-30 DIAGNOSIS — I10 ESSENTIAL HYPERTENSION: ICD-10-CM

## 2019-07-30 DIAGNOSIS — R73.03 PRE-DIABETES: Primary | ICD-10-CM

## 2019-07-30 DIAGNOSIS — E78.00 HYPERCHOLESTEROLEMIA: ICD-10-CM

## 2019-07-30 DIAGNOSIS — R73.03 PRE-DIABETES: ICD-10-CM

## 2019-07-30 LAB
ALBUMIN SERPL BCP-MCNC: 4 G/DL (ref 3.5–5.2)
ALP SERPL-CCNC: 64 U/L (ref 55–135)
ALT SERPL W/O P-5'-P-CCNC: 27 U/L (ref 10–44)
ANION GAP SERPL CALC-SCNC: 9 MMOL/L (ref 8–16)
AST SERPL-CCNC: 28 U/L (ref 10–40)
BILIRUB SERPL-MCNC: 0.8 MG/DL (ref 0.1–1)
BUN SERPL-MCNC: 14 MG/DL (ref 6–20)
CALCIUM SERPL-MCNC: 9.5 MG/DL (ref 8.7–10.5)
CHLORIDE SERPL-SCNC: 105 MMOL/L (ref 95–110)
CO2 SERPL-SCNC: 26 MMOL/L (ref 23–29)
CREAT SERPL-MCNC: 0.9 MG/DL (ref 0.5–1.4)
EST. GFR  (AFRICAN AMERICAN): >60 ML/MIN/1.73 M^2
EST. GFR  (NON AFRICAN AMERICAN): >60 ML/MIN/1.73 M^2
ESTIMATED AVG GLUCOSE: 117 MG/DL (ref 68–131)
GLUCOSE SERPL-MCNC: 117 MG/DL (ref 70–110)
HBA1C MFR BLD HPLC: 5.7 % (ref 4–5.6)
POTASSIUM SERPL-SCNC: 3.8 MMOL/L (ref 3.5–5.1)
PROT SERPL-MCNC: 7 G/DL (ref 6–8.4)
SODIUM SERPL-SCNC: 140 MMOL/L (ref 136–145)

## 2019-07-30 PROCEDURE — 3008F PR BODY MASS INDEX (BMI) DOCUMENTED: ICD-10-PCS | Mod: CPTII,S$GLB,, | Performed by: INTERNAL MEDICINE

## 2019-07-30 PROCEDURE — 3074F PR MOST RECENT SYSTOLIC BLOOD PRESSURE < 130 MM HG: ICD-10-PCS | Mod: CPTII,S$GLB,, | Performed by: INTERNAL MEDICINE

## 2019-07-30 PROCEDURE — 3008F BODY MASS INDEX DOCD: CPT | Mod: CPTII,S$GLB,, | Performed by: INTERNAL MEDICINE

## 2019-07-30 PROCEDURE — 99999 PR PBB SHADOW E&M-EST. PATIENT-LVL III: ICD-10-PCS | Mod: PBBFAC,,, | Performed by: INTERNAL MEDICINE

## 2019-07-30 PROCEDURE — 99999 PR PBB SHADOW E&M-EST. PATIENT-LVL III: CPT | Mod: PBBFAC,,, | Performed by: INTERNAL MEDICINE

## 2019-07-30 PROCEDURE — 99214 PR OFFICE/OUTPT VISIT, EST, LEVL IV, 30-39 MIN: ICD-10-PCS | Mod: S$GLB,,, | Performed by: INTERNAL MEDICINE

## 2019-07-30 PROCEDURE — 99214 OFFICE O/P EST MOD 30 MIN: CPT | Mod: S$GLB,,, | Performed by: INTERNAL MEDICINE

## 2019-07-30 PROCEDURE — 3078F DIAST BP <80 MM HG: CPT | Mod: CPTII,S$GLB,, | Performed by: INTERNAL MEDICINE

## 2019-07-30 PROCEDURE — 80053 COMPREHEN METABOLIC PANEL: CPT

## 2019-07-30 PROCEDURE — 3078F PR MOST RECENT DIASTOLIC BLOOD PRESSURE < 80 MM HG: ICD-10-PCS | Mod: CPTII,S$GLB,, | Performed by: INTERNAL MEDICINE

## 2019-07-30 PROCEDURE — 83036 HEMOGLOBIN GLYCOSYLATED A1C: CPT

## 2019-07-30 PROCEDURE — 3074F SYST BP LT 130 MM HG: CPT | Mod: CPTII,S$GLB,, | Performed by: INTERNAL MEDICINE

## 2019-07-30 PROCEDURE — 36415 COLL VENOUS BLD VENIPUNCTURE: CPT

## 2019-07-30 NOTE — PROGRESS NOTES
"Subjective:       Patient ID: Anselmo Nava is a 54 y.o. male.    Chief Complaint: Annual Exam    Here for annual exam    Patient presents today for routine evaluation, physical, and labs. Patient has no major concerns or complaints today.     Wakes at 3am to void but otherwise e denies urinary frequency, urinary urgency, decreased force of stream, incomplete emptying of bladder, post void dribble, nocturia, or gross hematuria.     On Xarelto for Hx of Factor v Leiden. He denies calf pain or swelling, SOB, SAHNI, pleuritic CP, hemoptysis, syncope, significant BRBPR, melena or recurrent epistaxis.     Denies frequent or current HA, intermittent blurry vision, dizziness, CP, or SOB.    Pt is tolerating statin without frequent muscle cramps or diffuse myalgias or weakness.          Review of Systems   Constitutional: Negative for appetite change, chills, fever and unexpected weight change.   HENT: Negative for hearing loss, sore throat and trouble swallowing.    Eyes: Negative for visual disturbance.   Respiratory: Negative for cough, chest tightness and shortness of breath.    Cardiovascular: Negative for chest pain and leg swelling.   Gastrointestinal: Negative for abdominal pain, blood in stool, constipation, diarrhea, nausea and vomiting.   Endocrine: Negative for polydipsia and polyuria.   Genitourinary: Negative for decreased urine volume, difficulty urinating, dysuria, frequency and urgency.   Musculoskeletal: Negative for gait problem.   Skin: Negative for rash.   Neurological: Negative for dizziness and numbness.   Psychiatric/Behavioral: The patient is not nervous/anxious.        Objective:      Vitals:    07/30/19 0842   BP: 120/74   Pulse: 71   SpO2: 96%   Weight: 106.9 kg (235 lb 10.8 oz)   Height: 6' 2" (1.88 m)      Physical Exam   Constitutional: He is oriented to person, place, and time. He appears well-developed and well-nourished. No distress.   HENT:   Head: Normocephalic and atraumatic. "   Mouth/Throat: Oropharynx is clear and moist. No oropharyngeal exudate.   Eyes: Pupils are equal, round, and reactive to light. Conjunctivae and EOM are normal. No scleral icterus.   Neck: No thyromegaly present.   Cardiovascular: Normal rate, regular rhythm and normal heart sounds.   No murmur heard.  Pulmonary/Chest: Effort normal and breath sounds normal. He has no wheezes. He has no rales.   Abdominal: Soft. He exhibits no distension. There is no tenderness.   Musculoskeletal: He exhibits no edema or tenderness.   Lymphadenopathy:     He has no cervical adenopathy.   Neurological: He is alert and oriented to person, place, and time.   Skin: Skin is warm and dry.   Psychiatric: He has a normal mood and affect. His behavior is normal.       Assessment:       1. Pre-diabetes    2. Essential hypertension    3. Factor V Leiden    4. Hypercholesterolemia        Plan:       Anselmo was seen today for annual exam.    Diagnoses and all orders for this visit:    Pre-diabetes  -     Hemoglobin A1c; Future    Essential hypertension  controlled. Continue current regimen    -     Comprehensive metabolic panel; Future    Factor V Leiden   controlled. Continue current regimen of Xarelto lifelong    Hypercholesterolemia   Tolerating statin. Continue this.        RTC in 6 months or sooner cordelia Savage MD  Internal Medicine-[facility]        Side effects of medication(s) were discussed in detail and patient voiced understanding.  Patient will call back for any issues or complications.

## 2019-08-13 ENCOUNTER — TELEPHONE (OUTPATIENT)
Dept: INTERNAL MEDICINE | Facility: CLINIC | Age: 54
End: 2019-08-13

## 2019-08-13 PROBLEM — Z15.89 HETEROZYGOUS MTHFR MUTATION A1298C: Status: ACTIVE | Noted: 2019-08-13

## 2019-08-13 PROBLEM — D68.52 HETEROZYGOUS FOR PROTHROMBIN G20210A MUTATION: Status: ACTIVE | Noted: 2019-08-13

## 2019-08-13 NOTE — TELEPHONE ENCOUNTER
When did this occur?  Needs eval if has or develops HA, blurry vision, dizziness, LOC, new tinnitus, new drainage from ears or nose
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
## 2019-08-14 NOTE — TELEPHONE ENCOUNTER
Spoke to Mr. Nava to inform him that due to lack of symptoms this far out from the event he is low risk for complications.  Instructed to call the office for any further questions or concerns.

## 2019-08-21 RX ORDER — ESCITALOPRAM OXALATE 10 MG/1
TABLET ORAL
Qty: 90 TABLET | Refills: 3 | Status: SHIPPED | OUTPATIENT
Start: 2019-08-21 | End: 2020-08-16

## 2019-11-18 RX ORDER — ATORVASTATIN CALCIUM 10 MG/1
TABLET, FILM COATED ORAL
Qty: 90 TABLET | Refills: 3 | Status: SHIPPED | OUTPATIENT
Start: 2019-11-18 | End: 2020-12-23 | Stop reason: SDUPTHER

## 2020-01-14 ENCOUNTER — PATIENT OUTREACH (OUTPATIENT)
Dept: ADMINISTRATIVE | Facility: HOSPITAL | Age: 55
End: 2020-01-14

## 2020-01-30 ENCOUNTER — LAB VISIT (OUTPATIENT)
Dept: LAB | Facility: OTHER | Age: 55
End: 2020-01-30
Attending: FAMILY MEDICINE
Payer: COMMERCIAL

## 2020-01-30 ENCOUNTER — OFFICE VISIT (OUTPATIENT)
Dept: INTERNAL MEDICINE | Facility: CLINIC | Age: 55
End: 2020-01-30
Attending: INTERNAL MEDICINE
Payer: COMMERCIAL

## 2020-01-30 VITALS
SYSTOLIC BLOOD PRESSURE: 122 MMHG | BODY MASS INDEX: 30.64 KG/M2 | DIASTOLIC BLOOD PRESSURE: 78 MMHG | HEIGHT: 74 IN | HEART RATE: 70 BPM | OXYGEN SATURATION: 95 % | WEIGHT: 238.75 LBS

## 2020-01-30 DIAGNOSIS — E78.00 HYPERCHOLESTEROLEMIA: ICD-10-CM

## 2020-01-30 DIAGNOSIS — I10 ESSENTIAL HYPERTENSION: ICD-10-CM

## 2020-01-30 DIAGNOSIS — D68.51 FACTOR V LEIDEN: ICD-10-CM

## 2020-01-30 DIAGNOSIS — Z00.00 ANNUAL PHYSICAL EXAM: Primary | ICD-10-CM

## 2020-01-30 LAB
ALBUMIN SERPL BCP-MCNC: 4 G/DL (ref 3.5–5.2)
ALP SERPL-CCNC: 63 U/L (ref 55–135)
ALT SERPL W/O P-5'-P-CCNC: 25 U/L (ref 10–44)
ANION GAP SERPL CALC-SCNC: 5 MMOL/L (ref 8–16)
AST SERPL-CCNC: 23 U/L (ref 10–40)
BILIRUB SERPL-MCNC: 0.6 MG/DL (ref 0.1–1)
BUN SERPL-MCNC: 9 MG/DL (ref 6–20)
CALCIUM SERPL-MCNC: 9.8 MG/DL (ref 8.7–10.5)
CHLORIDE SERPL-SCNC: 107 MMOL/L (ref 95–110)
CHOLEST SERPL-MCNC: 191 MG/DL (ref 120–199)
CHOLEST/HDLC SERPL: 2.7 {RATIO} (ref 2–5)
CO2 SERPL-SCNC: 29 MMOL/L (ref 23–29)
CREAT SERPL-MCNC: 0.9 MG/DL (ref 0.5–1.4)
EST. GFR  (AFRICAN AMERICAN): >60 ML/MIN/1.73 M^2
EST. GFR  (NON AFRICAN AMERICAN): >60 ML/MIN/1.73 M^2
ESTIMATED AVG GLUCOSE: 120 MG/DL (ref 68–131)
GLUCOSE SERPL-MCNC: 89 MG/DL (ref 70–110)
HBA1C MFR BLD HPLC: 5.8 % (ref 4–5.6)
HDLC SERPL-MCNC: 72 MG/DL (ref 40–75)
HDLC SERPL: 37.7 % (ref 20–50)
LDLC SERPL CALC-MCNC: 107.6 MG/DL (ref 63–159)
NONHDLC SERPL-MCNC: 119 MG/DL
POTASSIUM SERPL-SCNC: 4.5 MMOL/L (ref 3.5–5.1)
PROT SERPL-MCNC: 7 G/DL (ref 6–8.4)
SODIUM SERPL-SCNC: 141 MMOL/L (ref 136–145)
TRIGL SERPL-MCNC: 57 MG/DL (ref 30–150)
TSH SERPL DL<=0.005 MIU/L-ACNC: 2.21 UIU/ML (ref 0.4–4)

## 2020-01-30 PROCEDURE — 36415 COLL VENOUS BLD VENIPUNCTURE: CPT

## 2020-01-30 PROCEDURE — 99999 PR PBB SHADOW E&M-EST. PATIENT-LVL III: CPT | Mod: PBBFAC,,, | Performed by: FAMILY MEDICINE

## 2020-01-30 PROCEDURE — 3078F DIAST BP <80 MM HG: CPT | Mod: CPTII,S$GLB,, | Performed by: FAMILY MEDICINE

## 2020-01-30 PROCEDURE — 83036 HEMOGLOBIN GLYCOSYLATED A1C: CPT

## 2020-01-30 PROCEDURE — 99396 PR PREVENTIVE VISIT,EST,40-64: ICD-10-PCS | Mod: S$GLB,,, | Performed by: FAMILY MEDICINE

## 2020-01-30 PROCEDURE — 99999 PR PBB SHADOW E&M-EST. PATIENT-LVL III: ICD-10-PCS | Mod: PBBFAC,,, | Performed by: FAMILY MEDICINE

## 2020-01-30 PROCEDURE — 3074F PR MOST RECENT SYSTOLIC BLOOD PRESSURE < 130 MM HG: ICD-10-PCS | Mod: CPTII,S$GLB,, | Performed by: FAMILY MEDICINE

## 2020-01-30 PROCEDURE — 80061 LIPID PANEL: CPT

## 2020-01-30 PROCEDURE — 80053 COMPREHEN METABOLIC PANEL: CPT

## 2020-01-30 PROCEDURE — 3078F PR MOST RECENT DIASTOLIC BLOOD PRESSURE < 80 MM HG: ICD-10-PCS | Mod: CPTII,S$GLB,, | Performed by: FAMILY MEDICINE

## 2020-01-30 PROCEDURE — 99396 PREV VISIT EST AGE 40-64: CPT | Mod: S$GLB,,, | Performed by: FAMILY MEDICINE

## 2020-01-30 PROCEDURE — 3074F SYST BP LT 130 MM HG: CPT | Mod: CPTII,S$GLB,, | Performed by: FAMILY MEDICINE

## 2020-01-30 PROCEDURE — 84443 ASSAY THYROID STIM HORMONE: CPT

## 2020-01-30 NOTE — PROGRESS NOTES
CHIEF COMPLAINT:  Annual    HISTORY OF PRESENT ILLNESS: The patient is a generally healthy 54 year-old white male.  The patient has no specific complaints today.  He is normally seen by 1 of my partners.    Interestingly he comes from a long line of hypercoagulable family members.  Many of his close family members have had DVTs or pulmonary embolism.  About 5 years ago he developed an acute pulmonary embolism and was ultimately diagnosed with factor 5 leiden deficiency.  He is on lifelong anticoagulation.    The patient has a history of stable hypertension on current medications.  Patient denies chest pain or shortness of breath today.    The patient has a history of stable hyperlipidemia on current medications.  The patient denies chest pain or shortness of breath today.  The patient denies muscle aches or myalgias suggestive of myositis.    REVIEW OF SYSTEMS:  GENERAL: No fever, chills, fatigability or weight loss.  SKIN: No rashes, itching or changes in color or texture of skin.  HEAD: No headaches or recent head trauma.  EYES: Visual acuity fine. No photophobia, ocular pain or diplopia.  EARS: Denies ear pain, discharge or vertigo.  NOSE: No loss of smell, no epistaxis or postnasal drip.  MOUTH & THROAT: No hoarseness or change in voice. No excessive gum bleeding.  NODES: Denies swollen glands.  CHEST: Denies SAHNI, cyanosis, wheezing, cough and sputum production.  CARDIOVASCULAR: Denies chest pain, PND, orthopnea or reduced exercise tolerance.  ABDOMEN: Appetite fine. No weight loss. Denies diarrhea, abdominal pain, hematemesis or blood in stool.  URINARY: No flank pain, dysuria or hematuria.  PERIPHERAL VASCULAR: No claudication or cyanosis.  MUSCULOSKELETAL: No joint stiffness or swelling. Denies back pain.  NEUROLOGIC: No history of seizures, paralysis, alteration of gait or coordination.    SOCIAL HISTORY: The patient does not smoke.  The patient consumes alcohol socially.  The patient is .    PHYSICAL  "EXAMINATION:   Blood pressure 122/78, pulse 70, height 6' 2" (1.88 m), weight 108.3 kg (238 lb 12.1 oz), SpO2 95 %.    APPEARANCE: Well nourished, well developed, in no acute distress.    HEAD: Normocephalic, atraumatic.  EYES: PERRL. EOMI.  Conjunctivae without injection and  anicteric  EARS: TM's intact. Light reflex normal. No retraction or perforation.    NOSE: Mucosa pink. Airway clear.  MOUTH & THROAT: No tonsillar enlargement. No pharyngeal erythema or exudate. No stridor.  NECK: Supple.   NODES: No cervical, axillary or inguinal lymph node enlargement.  CHEST: Lungs clear to auscultation.  No retractions are noted.  No rales or rhonchi are present.  CARDIOVASCULAR: Normal S1, S2. No rubs, murmurs or gallops.  ABDOMEN: Bowel sounds normal. Not distended. Soft. No tenderness or masses.  No ascites is noted.  MUSCULOSKELETAL:  There is no clubbing, cyanosis, or edema of the extremities x4.  There is full range of motion of the lumbar spine.  There is full range of motion of the extremities x4.  There is no deformity noted.    NEUROLOGIC:       Normal speech development.      Hearing normal.      Normal gait.      Motor and sensory exams grossly normal.  PSYCHIATRIC: Patient is alert and oriented x3.  Thought processes are all normal.  There is no homicidality.  There is no suicidality.  There is no evidence of psychosis.    LABORATORY/RADIOLOGY:   Chart reviewed.  We will update blood work today.    ASSESSMENT:   Annual  Hypertension  Hyperlipidemia  PE d/t factor 5 about 5 years ago    PLAN:  We will follow-up blood work which we expect to be normal.  Xarelto refazul  Follow up with PCP    Answers for HPI/ROS submitted by the patient on 1/27/2020   activity change: No  unexpected weight change: No  neck pain: No  hearing loss: No  rhinorrhea: No  trouble swallowing: No  eye discharge: No  visual disturbance: No  chest tightness: No  wheezing: No  chest pain: No  palpitations: No  blood in stool: " No  constipation: No  vomiting: No  diarrhea: No  polydipsia: No  polyuria: No  difficulty urinating: No  urgency: No  hematuria: No  joint swelling: No  arthralgias: No  headaches: No  weakness: No  confusion: No  dysphoric mood: No

## 2020-02-13 ENCOUNTER — TELEPHONE (OUTPATIENT)
Dept: INTERNAL MEDICINE | Facility: CLINIC | Age: 55
End: 2020-02-13

## 2020-03-23 RX ORDER — VALSARTAN 80 MG/1
TABLET ORAL
Qty: 90 TABLET | Refills: 3 | Status: SHIPPED | OUTPATIENT
Start: 2020-03-23 | End: 2020-05-20

## 2020-03-24 ENCOUNTER — NURSE TRIAGE (OUTPATIENT)
Dept: ADMINISTRATIVE | Facility: CLINIC | Age: 55
End: 2020-03-24

## 2020-04-10 ENCOUNTER — NURSE TRIAGE (OUTPATIENT)
Dept: ADMINISTRATIVE | Facility: CLINIC | Age: 55
End: 2020-04-10

## 2020-04-11 NOTE — TELEPHONE ENCOUNTER
Reason for Disposition   Message left on identified voice mail    Additional Information   Negative: Caller is angry or rude (e.g., hangs up, verbally abusive, yelling)   Negative: Caller hangs up   Negative: Caller has already spoken with the PCP and has no further questions.   Negative: Caller has already spoken with another triager and has no further questions.   Negative: Caller has already spoken with another triager or PCP AND has further questions AND triager able to answer questions.   Negative: No answer.  First attempt to contact caller.  Follow-up call scheduled within 15 minutes.   Negative: Busy signal.  First attempt to contact caller.  Follow-up call scheduled within 15 minutes.    Protocols used: NO CONTACT OR DUPLICATE CONTACT CALL-MARIA LUISA-  LM x2 at 801pm at [phone]   89 year old female received from night RN. Pt presented to the ED with c/o chest heaviness and sob. Pt on supplemental )2 @ 2 L via nasal cannula. Pt denies pain at thi time. PT A_O x 4, but [place]. S!/S2 heard. Respirations even. dyspnea on exertion. skin color pale.

## 2020-04-11 NOTE — TELEPHONE ENCOUNTER
Wife states she noticed swelling to RLE, calf area today. Pt has history of PE and Factor V. Wife advised per protocol to be seen within 4 hours and wife verbalizes understanding.     Reason for Disposition   SEVERE leg swelling (e.g., swelling extends above knee, entire leg is swollen, weeping fluid)    Additional Information   Negative: Severe difficulty breathing (e.g., struggling for each breath, speaks in single words)   Negative: Difficulty breathing at rest   Negative: Looks like a broken bone or dislocated joint (e.g., crooked or deformed)   Negative: Sounds like a life-threatening emergency to the triager   Negative: Entire foot is cool or blue in comparison to other side   Negative: [1] Can't walk or can barely walk AND [2] new onset   Negative: [1] Difficulty breathing with exertion (e.g., walking) AND [2] new onset or worsening   Negative: [1] Red area or streak AND [2] fever   Negative: [1] Swelling is painful to touch AND [2] fever   Negative: [1] Cast on leg or ankle AND [2] now increased pain   Negative: Patient sounds very sick or weak to the triager    Protocols used: LEG SWELLING AND EDEMA-A-AH

## 2020-05-20 ENCOUNTER — TELEPHONE (OUTPATIENT)
Dept: PRIMARY CARE CLINIC | Facility: CLINIC | Age: 55
End: 2020-05-20

## 2020-05-20 RX ORDER — IRBESARTAN 75 MG/1
75 TABLET ORAL NIGHTLY
Qty: 90 TABLET | Refills: 3 | Status: SHIPPED | OUTPATIENT
Start: 2020-05-20 | End: 2021-05-13 | Stop reason: SDUPTHER

## 2020-05-20 NOTE — TELEPHONE ENCOUNTER
LVM stating that the we sent in a replacement medication for his blood pressure. Check with pharmacy to see if ready

## 2020-07-28 ENCOUNTER — LAB VISIT (OUTPATIENT)
Dept: LAB | Facility: OTHER | Age: 55
End: 2020-07-28
Attending: INTERNAL MEDICINE
Payer: COMMERCIAL

## 2020-07-28 ENCOUNTER — OFFICE VISIT (OUTPATIENT)
Dept: INTERNAL MEDICINE | Facility: CLINIC | Age: 55
End: 2020-07-28
Attending: INTERNAL MEDICINE
Payer: COMMERCIAL

## 2020-07-28 VITALS
BODY MASS INDEX: 29.96 KG/M2 | HEIGHT: 74 IN | DIASTOLIC BLOOD PRESSURE: 84 MMHG | HEART RATE: 72 BPM | OXYGEN SATURATION: 96 % | WEIGHT: 233.44 LBS | SYSTOLIC BLOOD PRESSURE: 134 MMHG

## 2020-07-28 DIAGNOSIS — R73.03 PRE-DIABETES: ICD-10-CM

## 2020-07-28 DIAGNOSIS — I10 ESSENTIAL HYPERTENSION: ICD-10-CM

## 2020-07-28 DIAGNOSIS — Z12.5 PROSTATE CANCER SCREENING: ICD-10-CM

## 2020-07-28 DIAGNOSIS — S86.011S PARTIAL TEAR OF RIGHT ACHILLES TENDON, SEQUELA: ICD-10-CM

## 2020-07-28 DIAGNOSIS — D68.51 FACTOR V LEIDEN: Primary | ICD-10-CM

## 2020-07-28 DIAGNOSIS — Z12.11 COLON CANCER SCREENING: ICD-10-CM

## 2020-07-28 LAB
ALBUMIN SERPL BCP-MCNC: 3.9 G/DL (ref 3.5–5.2)
ALP SERPL-CCNC: 61 U/L (ref 55–135)
ALT SERPL W/O P-5'-P-CCNC: 18 U/L (ref 10–44)
ANION GAP SERPL CALC-SCNC: 9 MMOL/L (ref 8–16)
AST SERPL-CCNC: 28 U/L (ref 10–40)
BILIRUB SERPL-MCNC: 0.9 MG/DL (ref 0.1–1)
BUN SERPL-MCNC: 12 MG/DL (ref 6–20)
CALCIUM SERPL-MCNC: 9.5 MG/DL (ref 8.7–10.5)
CHLORIDE SERPL-SCNC: 106 MMOL/L (ref 95–110)
CO2 SERPL-SCNC: 28 MMOL/L (ref 23–29)
COMPLEXED PSA SERPL-MCNC: 0.69 NG/ML (ref 0–4)
CREAT SERPL-MCNC: 0.9 MG/DL (ref 0.5–1.4)
EST. GFR  (AFRICAN AMERICAN): >60 ML/MIN/1.73 M^2
EST. GFR  (NON AFRICAN AMERICAN): >60 ML/MIN/1.73 M^2
ESTIMATED AVG GLUCOSE: 117 MG/DL (ref 68–131)
GLUCOSE SERPL-MCNC: 111 MG/DL (ref 70–110)
HBA1C MFR BLD HPLC: 5.7 % (ref 4–5.6)
POTASSIUM SERPL-SCNC: 4.5 MMOL/L (ref 3.5–5.1)
PROT SERPL-MCNC: 6.8 G/DL (ref 6–8.4)
SODIUM SERPL-SCNC: 143 MMOL/L (ref 136–145)

## 2020-07-28 PROCEDURE — 99999 PR PBB SHADOW E&M-EST. PATIENT-LVL III: CPT | Mod: PBBFAC,,, | Performed by: INTERNAL MEDICINE

## 2020-07-28 PROCEDURE — 36415 COLL VENOUS BLD VENIPUNCTURE: CPT

## 2020-07-28 PROCEDURE — 99214 OFFICE O/P EST MOD 30 MIN: CPT | Mod: S$GLB,,, | Performed by: INTERNAL MEDICINE

## 2020-07-28 PROCEDURE — 99214 PR OFFICE/OUTPT VISIT, EST, LEVL IV, 30-39 MIN: ICD-10-PCS | Mod: S$GLB,,, | Performed by: INTERNAL MEDICINE

## 2020-07-28 PROCEDURE — 84153 ASSAY OF PSA TOTAL: CPT

## 2020-07-28 PROCEDURE — 99999 PR PBB SHADOW E&M-EST. PATIENT-LVL III: ICD-10-PCS | Mod: PBBFAC,,, | Performed by: INTERNAL MEDICINE

## 2020-07-28 PROCEDURE — 3008F PR BODY MASS INDEX (BMI) DOCUMENTED: ICD-10-PCS | Mod: CPTII,S$GLB,, | Performed by: INTERNAL MEDICINE

## 2020-07-28 PROCEDURE — 83036 HEMOGLOBIN GLYCOSYLATED A1C: CPT

## 2020-07-28 PROCEDURE — 3008F BODY MASS INDEX DOCD: CPT | Mod: CPTII,S$GLB,, | Performed by: INTERNAL MEDICINE

## 2020-07-28 PROCEDURE — 3079F PR MOST RECENT DIASTOLIC BLOOD PRESSURE 80-89 MM HG: ICD-10-PCS | Mod: CPTII,S$GLB,, | Performed by: INTERNAL MEDICINE

## 2020-07-28 PROCEDURE — 3075F SYST BP GE 130 - 139MM HG: CPT | Mod: CPTII,S$GLB,, | Performed by: INTERNAL MEDICINE

## 2020-07-28 PROCEDURE — 80053 COMPREHEN METABOLIC PANEL: CPT

## 2020-07-28 PROCEDURE — 3079F DIAST BP 80-89 MM HG: CPT | Mod: CPTII,S$GLB,, | Performed by: INTERNAL MEDICINE

## 2020-07-28 PROCEDURE — 3075F PR MOST RECENT SYSTOLIC BLOOD PRESS GE 130-139MM HG: ICD-10-PCS | Mod: CPTII,S$GLB,, | Performed by: INTERNAL MEDICINE

## 2020-07-28 NOTE — PROGRESS NOTES
"Subjective:       Patient ID: Anselmo Nava is a 55 y.o. male.    Chief Complaint: Follow-up    Here for annual exam    Tear of achilles surrounding memorial day. Not complete. Dr Joe Parr orthopedist. Pt not interested in surgery. Pain is intermittent.     Factor V leiden- Diagnosed PE about 5 years prior and sounds like he had thrombolysis done. Follows with cardiologist Princess twice a year along with hematologist jim Simental. On xarelto. Denies bleeding complications. Does report easy bruising but is not bothersome.     Pt is tolerating statin without frequent muscle cramps or diffuse myalgias or weakness.      Review of Systems   Constitutional: Negative for appetite change, chills, fever and unexpected weight change.   HENT: Negative for hearing loss, sore throat and trouble swallowing.    Eyes: Negative for visual disturbance.   Respiratory: Negative for cough, chest tightness and shortness of breath.    Cardiovascular: Negative for chest pain and leg swelling.   Gastrointestinal: Negative for abdominal pain, blood in stool, constipation, diarrhea, nausea and vomiting.   Endocrine: Negative for polydipsia and polyuria.   Genitourinary: Negative for decreased urine volume, difficulty urinating, dysuria, frequency and urgency.   Musculoskeletal: Negative for gait problem.   Skin: Negative for rash.   Neurological: Negative for dizziness and numbness.   Psychiatric/Behavioral: The patient is not nervous/anxious.        Objective:      Vitals:    07/28/20 0844   BP: 134/84   Pulse: 72   SpO2: 96%   Weight: 105.9 kg (233 lb 7.5 oz)   Height: 6' 2" (1.88 m)      Physical Exam  Constitutional:       General: He is not in acute distress.     Appearance: He is well-developed.   HENT:      Head: Normocephalic and atraumatic.      Mouth/Throat:      Pharynx: No oropharyngeal exudate.   Eyes:      General: No scleral icterus.     Conjunctiva/sclera: Conjunctivae normal.      Pupils: Pupils are equal, round, and " reactive to light.   Neck:      Thyroid: No thyromegaly.   Cardiovascular:      Rate and Rhythm: Normal rate and regular rhythm.      Heart sounds: Normal heart sounds. No murmur.   Pulmonary:      Effort: Pulmonary effort is normal.      Breath sounds: Normal breath sounds. No wheezing or rales.   Abdominal:      General: There is no distension.      Palpations: Abdomen is soft.      Tenderness: There is no abdominal tenderness.   Musculoskeletal:         General: No tenderness.   Lymphadenopathy:      Cervical: No cervical adenopathy.   Skin:     General: Skin is warm and dry.   Neurological:      Mental Status: He is alert and oriented to person, place, and time.   Psychiatric:         Behavior: Behavior normal.         Assessment:       1. Factor V Leiden    2. Prostate cancer screening    3. Pre-diabetes    4. Colon cancer screening    5. Partial tear of right Achilles tendon, sequela    6. Essential hypertension        Plan:       Anselmo was seen today for follow-up.    Diagnoses and all orders for this visit:    Factor V Leiden    Prostate cancer screening  -     PSA, Screening; Future    Pre-diabetes  -     Comprehensive metabolic panel; Future  -     Hemoglobin A1C; Future    Colon cancer screening  -     Ambulatory referral/consult to Gastroenterology; Future    Partial tear of right Achilles tendon, sequela  Comments:  2020 Dr Joe Parr. No surgery    Essential hypertension   controlled. Continue current regimen    RTC in 6 months or sooner cordelia Savage MD  [facility] Medicine-[facility]        Side effects of medication(s) were discussed in detail and patient voiced understanding.  Patient will call back for any issues or complications.

## 2020-12-23 RX ORDER — ATORVASTATIN CALCIUM 10 MG/1
10 TABLET, FILM COATED ORAL DAILY
Qty: 90 TABLET | Refills: 3 | Status: SHIPPED | OUTPATIENT
Start: 2020-12-23 | End: 2021-05-13 | Stop reason: SDUPTHER

## 2021-01-26 ENCOUNTER — OFFICE VISIT (OUTPATIENT)
Dept: INTERNAL MEDICINE | Facility: CLINIC | Age: 56
End: 2021-01-26
Attending: INTERNAL MEDICINE
Payer: COMMERCIAL

## 2021-01-26 ENCOUNTER — CLINICAL SUPPORT (OUTPATIENT)
Dept: INTERNAL MEDICINE | Facility: CLINIC | Age: 56
End: 2021-01-26
Payer: COMMERCIAL

## 2021-01-26 ENCOUNTER — LAB VISIT (OUTPATIENT)
Dept: LAB | Facility: OTHER | Age: 56
End: 2021-01-26
Attending: INTERNAL MEDICINE
Payer: COMMERCIAL

## 2021-01-26 VITALS
WEIGHT: 239.19 LBS | HEART RATE: 74 BPM | BODY MASS INDEX: 30.7 KG/M2 | DIASTOLIC BLOOD PRESSURE: 86 MMHG | SYSTOLIC BLOOD PRESSURE: 121 MMHG | HEIGHT: 74 IN | OXYGEN SATURATION: 97 %

## 2021-01-26 DIAGNOSIS — Z23 IMMUNIZATION DUE: Primary | ICD-10-CM

## 2021-01-26 DIAGNOSIS — Z12.11 COLON CANCER SCREENING: ICD-10-CM

## 2021-01-26 DIAGNOSIS — Z00.00 ANNUAL PHYSICAL EXAM: Primary | ICD-10-CM

## 2021-01-26 DIAGNOSIS — E78.00 HYPERCHOLESTEROLEMIA: ICD-10-CM

## 2021-01-26 DIAGNOSIS — K22.2 ESOPHAGEAL RING: ICD-10-CM

## 2021-01-26 DIAGNOSIS — D68.51 FACTOR V LEIDEN: ICD-10-CM

## 2021-01-26 DIAGNOSIS — Z00.00 ANNUAL PHYSICAL EXAM: ICD-10-CM

## 2021-01-26 DIAGNOSIS — I10 ESSENTIAL HYPERTENSION: ICD-10-CM

## 2021-01-26 LAB
ALBUMIN SERPL BCP-MCNC: 4.3 G/DL (ref 3.5–5.2)
ALP SERPL-CCNC: 61 U/L (ref 55–135)
ALT SERPL W/O P-5'-P-CCNC: 23 U/L (ref 10–44)
ANION GAP SERPL CALC-SCNC: 11 MMOL/L (ref 8–16)
AST SERPL-CCNC: 22 U/L (ref 10–40)
BASOPHILS # BLD AUTO: 0.03 K/UL (ref 0–0.2)
BASOPHILS NFR BLD: 0.6 % (ref 0–1.9)
BILIRUB SERPL-MCNC: 0.6 MG/DL (ref 0.1–1)
BUN SERPL-MCNC: 13 MG/DL (ref 6–20)
CALCIUM SERPL-MCNC: 9.4 MG/DL (ref 8.7–10.5)
CHLORIDE SERPL-SCNC: 104 MMOL/L (ref 95–110)
CHOLEST SERPL-MCNC: 199 MG/DL (ref 120–199)
CHOLEST/HDLC SERPL: 2.7 {RATIO} (ref 2–5)
CO2 SERPL-SCNC: 26 MMOL/L (ref 23–29)
CREAT SERPL-MCNC: 0.9 MG/DL (ref 0.5–1.4)
DIFFERENTIAL METHOD: ABNORMAL
EOSINOPHIL # BLD AUTO: 0.1 K/UL (ref 0–0.5)
EOSINOPHIL NFR BLD: 1.7 % (ref 0–8)
ERYTHROCYTE [DISTWIDTH] IN BLOOD BY AUTOMATED COUNT: 13.3 % (ref 11.5–14.5)
EST. GFR  (AFRICAN AMERICAN): >60 ML/MIN/1.73 M^2
EST. GFR  (NON AFRICAN AMERICAN): >60 ML/MIN/1.73 M^2
ESTIMATED AVG GLUCOSE: 117 MG/DL (ref 68–131)
GLUCOSE SERPL-MCNC: 100 MG/DL (ref 70–110)
HBA1C MFR BLD HPLC: 5.7 % (ref 4–5.6)
HCT VFR BLD AUTO: 45.8 % (ref 40–54)
HDLC SERPL-MCNC: 74 MG/DL (ref 40–75)
HDLC SERPL: 37.2 % (ref 20–50)
HGB BLD-MCNC: 15.2 G/DL (ref 14–18)
IMM GRANULOCYTES # BLD AUTO: 0.02 K/UL (ref 0–0.04)
IMM GRANULOCYTES NFR BLD AUTO: 0.4 % (ref 0–0.5)
LDLC SERPL CALC-MCNC: 103.6 MG/DL (ref 63–159)
LYMPHOCYTES # BLD AUTO: 1.7 K/UL (ref 1–4.8)
LYMPHOCYTES NFR BLD: 36.4 % (ref 18–48)
MCH RBC QN AUTO: 32.9 PG (ref 27–31)
MCHC RBC AUTO-ENTMCNC: 33.2 G/DL (ref 32–36)
MCV RBC AUTO: 99 FL (ref 82–98)
MONOCYTES # BLD AUTO: 0.6 K/UL (ref 0.3–1)
MONOCYTES NFR BLD: 12.4 % (ref 4–15)
NEUTROPHILS # BLD AUTO: 2.3 K/UL (ref 1.8–7.7)
NEUTROPHILS NFR BLD: 48.5 % (ref 38–73)
NONHDLC SERPL-MCNC: 125 MG/DL
NRBC BLD-RTO: 0 /100 WBC
PLATELET # BLD AUTO: 227 K/UL (ref 150–350)
PMV BLD AUTO: 10.6 FL (ref 9.2–12.9)
POTASSIUM SERPL-SCNC: 4.3 MMOL/L (ref 3.5–5.1)
PROT SERPL-MCNC: 7.1 G/DL (ref 6–8.4)
RBC # BLD AUTO: 4.62 M/UL (ref 4.6–6.2)
SODIUM SERPL-SCNC: 141 MMOL/L (ref 136–145)
TRIGL SERPL-MCNC: 107 MG/DL (ref 30–150)
TSH SERPL DL<=0.005 MIU/L-ACNC: 2.23 UIU/ML (ref 0.4–4)
WBC # BLD AUTO: 4.75 K/UL (ref 3.9–12.7)

## 2021-01-26 PROCEDURE — 84443 ASSAY THYROID STIM HORMONE: CPT

## 2021-01-26 PROCEDURE — 3079F DIAST BP 80-89 MM HG: CPT | Mod: CPTII,S$GLB,, | Performed by: INTERNAL MEDICINE

## 2021-01-26 PROCEDURE — 3008F BODY MASS INDEX DOCD: CPT | Mod: CPTII,S$GLB,, | Performed by: INTERNAL MEDICINE

## 2021-01-26 PROCEDURE — 90471 IMMUNIZATION ADMIN: CPT | Mod: S$GLB,,, | Performed by: INTERNAL MEDICINE

## 2021-01-26 PROCEDURE — 1126F PR PAIN SEVERITY QUANTIFIED, NO PAIN PRESENT: ICD-10-PCS | Mod: S$GLB,,, | Performed by: INTERNAL MEDICINE

## 2021-01-26 PROCEDURE — 99214 PR OFFICE/OUTPT VISIT, EST, LEVL IV, 30-39 MIN: ICD-10-PCS | Mod: 25,S$GLB,, | Performed by: INTERNAL MEDICINE

## 2021-01-26 PROCEDURE — 1126F AMNT PAIN NOTED NONE PRSNT: CPT | Mod: S$GLB,,, | Performed by: INTERNAL MEDICINE

## 2021-01-26 PROCEDURE — 99214 OFFICE O/P EST MOD 30 MIN: CPT | Mod: 25,S$GLB,, | Performed by: INTERNAL MEDICINE

## 2021-01-26 PROCEDURE — 90686 FLU VACCINE (QUAD) GREATER THAN OR EQUAL TO 3YO PRESERVATIVE FREE IM: ICD-10-PCS | Mod: S$GLB,,, | Performed by: INTERNAL MEDICINE

## 2021-01-26 PROCEDURE — 80053 COMPREHEN METABOLIC PANEL: CPT

## 2021-01-26 PROCEDURE — 99999 PR PBB SHADOW E&M-EST. PATIENT-LVL V: ICD-10-PCS | Mod: PBBFAC,,, | Performed by: INTERNAL MEDICINE

## 2021-01-26 PROCEDURE — 90471 FLU VACCINE (QUAD) GREATER THAN OR EQUAL TO 3YO PRESERVATIVE FREE IM: ICD-10-PCS | Mod: S$GLB,,, | Performed by: INTERNAL MEDICINE

## 2021-01-26 PROCEDURE — 3074F PR MOST RECENT SYSTOLIC BLOOD PRESSURE < 130 MM HG: ICD-10-PCS | Mod: CPTII,S$GLB,, | Performed by: INTERNAL MEDICINE

## 2021-01-26 PROCEDURE — 36415 COLL VENOUS BLD VENIPUNCTURE: CPT

## 2021-01-26 PROCEDURE — 99999 PR PBB SHADOW E&M-EST. PATIENT-LVL V: CPT | Mod: PBBFAC,,, | Performed by: INTERNAL MEDICINE

## 2021-01-26 PROCEDURE — 3079F PR MOST RECENT DIASTOLIC BLOOD PRESSURE 80-89 MM HG: ICD-10-PCS | Mod: CPTII,S$GLB,, | Performed by: INTERNAL MEDICINE

## 2021-01-26 PROCEDURE — 85025 COMPLETE CBC W/AUTO DIFF WBC: CPT

## 2021-01-26 PROCEDURE — 90686 IIV4 VACC NO PRSV 0.5 ML IM: CPT | Mod: S$GLB,,, | Performed by: INTERNAL MEDICINE

## 2021-01-26 PROCEDURE — 83036 HEMOGLOBIN GLYCOSYLATED A1C: CPT

## 2021-01-26 PROCEDURE — 80061 LIPID PANEL: CPT

## 2021-01-26 PROCEDURE — 3008F PR BODY MASS INDEX (BMI) DOCUMENTED: ICD-10-PCS | Mod: CPTII,S$GLB,, | Performed by: INTERNAL MEDICINE

## 2021-01-26 PROCEDURE — 3074F SYST BP LT 130 MM HG: CPT | Mod: CPTII,S$GLB,, | Performed by: INTERNAL MEDICINE

## 2021-01-28 ENCOUNTER — TELEPHONE (OUTPATIENT)
Dept: INTERNAL MEDICINE | Facility: CLINIC | Age: 56
End: 2021-01-28

## 2021-02-03 ENCOUNTER — TELEPHONE (OUTPATIENT)
Dept: HEMATOLOGY/ONCOLOGY | Facility: CLINIC | Age: 56
End: 2021-02-03

## 2021-03-30 ENCOUNTER — OFFICE VISIT (OUTPATIENT)
Dept: HEMATOLOGY/ONCOLOGY | Facility: CLINIC | Age: 56
End: 2021-03-30
Attending: INTERNAL MEDICINE
Payer: COMMERCIAL

## 2021-03-30 VITALS
TEMPERATURE: 99 F | OXYGEN SATURATION: 98 % | HEART RATE: 71 BPM | DIASTOLIC BLOOD PRESSURE: 93 MMHG | WEIGHT: 236.75 LBS | RESPIRATION RATE: 18 BRPM | BODY MASS INDEX: 30.38 KG/M2 | SYSTOLIC BLOOD PRESSURE: 131 MMHG | HEIGHT: 74 IN

## 2021-03-30 DIAGNOSIS — Z86.711 HISTORY OF PULMONARY EMBOLISM: Primary | ICD-10-CM

## 2021-03-30 DIAGNOSIS — D68.51 FACTOR V LEIDEN: ICD-10-CM

## 2021-03-30 DIAGNOSIS — Z79.01 CHRONIC ANTICOAGULATION: ICD-10-CM

## 2021-03-30 DIAGNOSIS — Z82.49 FAMILY HISTORY OF BLOOD CLOTS: ICD-10-CM

## 2021-03-30 PROCEDURE — 99999 PR PBB SHADOW E&M-EST. PATIENT-LVL IV: ICD-10-PCS | Mod: PBBFAC,,, | Performed by: STUDENT IN AN ORGANIZED HEALTH CARE EDUCATION/TRAINING PROGRAM

## 2021-03-30 PROCEDURE — 1126F PR PAIN SEVERITY QUANTIFIED, NO PAIN PRESENT: ICD-10-PCS | Mod: S$GLB,,, | Performed by: STUDENT IN AN ORGANIZED HEALTH CARE EDUCATION/TRAINING PROGRAM

## 2021-03-30 PROCEDURE — 99999 PR PBB SHADOW E&M-EST. PATIENT-LVL IV: CPT | Mod: PBBFAC,,, | Performed by: STUDENT IN AN ORGANIZED HEALTH CARE EDUCATION/TRAINING PROGRAM

## 2021-03-30 PROCEDURE — 1126F AMNT PAIN NOTED NONE PRSNT: CPT | Mod: S$GLB,,, | Performed by: STUDENT IN AN ORGANIZED HEALTH CARE EDUCATION/TRAINING PROGRAM

## 2021-03-30 PROCEDURE — 99204 PR OFFICE/OUTPT VISIT, NEW, LEVL IV, 45-59 MIN: ICD-10-PCS | Mod: S$GLB,,, | Performed by: STUDENT IN AN ORGANIZED HEALTH CARE EDUCATION/TRAINING PROGRAM

## 2021-03-30 PROCEDURE — 99204 OFFICE O/P NEW MOD 45 MIN: CPT | Mod: S$GLB,,, | Performed by: STUDENT IN AN ORGANIZED HEALTH CARE EDUCATION/TRAINING PROGRAM

## 2021-03-30 PROCEDURE — 3008F PR BODY MASS INDEX (BMI) DOCUMENTED: ICD-10-PCS | Mod: CPTII,S$GLB,, | Performed by: STUDENT IN AN ORGANIZED HEALTH CARE EDUCATION/TRAINING PROGRAM

## 2021-03-30 PROCEDURE — 3075F SYST BP GE 130 - 139MM HG: CPT | Mod: CPTII,S$GLB,, | Performed by: STUDENT IN AN ORGANIZED HEALTH CARE EDUCATION/TRAINING PROGRAM

## 2021-03-30 PROCEDURE — 3080F PR MOST RECENT DIASTOLIC BLOOD PRESSURE >= 90 MM HG: ICD-10-PCS | Mod: CPTII,S$GLB,, | Performed by: STUDENT IN AN ORGANIZED HEALTH CARE EDUCATION/TRAINING PROGRAM

## 2021-03-30 PROCEDURE — 3008F BODY MASS INDEX DOCD: CPT | Mod: CPTII,S$GLB,, | Performed by: STUDENT IN AN ORGANIZED HEALTH CARE EDUCATION/TRAINING PROGRAM

## 2021-03-30 PROCEDURE — 3075F PR MOST RECENT SYSTOLIC BLOOD PRESS GE 130-139MM HG: ICD-10-PCS | Mod: CPTII,S$GLB,, | Performed by: STUDENT IN AN ORGANIZED HEALTH CARE EDUCATION/TRAINING PROGRAM

## 2021-03-30 PROCEDURE — 3080F DIAST BP >= 90 MM HG: CPT | Mod: CPTII,S$GLB,, | Performed by: STUDENT IN AN ORGANIZED HEALTH CARE EDUCATION/TRAINING PROGRAM

## 2021-03-30 RX ORDER — ACETAMINOPHEN, DEXTROMETHORPHAN HBR, DOXYLAMINE SUCCINATE, PHENYLEPHRINE HCL 650; 20; 12.5; 1 MG/30ML; MG/30ML; MG/30ML; MG/30ML
1 SOLUTION ORAL DAILY
COMMUNITY
End: 2022-01-25 | Stop reason: SDUPTHER

## 2021-03-30 RX ORDER — FOLIC ACID 0.8 MG
800 TABLET ORAL DAILY
COMMUNITY

## 2021-05-10 DIAGNOSIS — I10 ESSENTIAL HYPERTENSION: Primary | ICD-10-CM

## 2021-05-10 DIAGNOSIS — D68.51 FACTOR V LEIDEN: ICD-10-CM

## 2021-05-10 RX ORDER — RIVAROXABAN 20 MG/1
1 TABLET, FILM COATED ORAL DAILY
Qty: 90 TABLET | Refills: 3 | Status: SHIPPED | OUTPATIENT
Start: 2021-05-10 | End: 2021-12-13

## 2021-05-10 NOTE — TELEPHONE ENCOUNTER
----- Message from Andres Queen sent at 5/10/2021  2:52 PM CDT -----   Name of Who is Calling:     What is the request in detail:  patient request call back in reference to approval  for medication   XARELTO 20 mg Tab   / patient also need prior authorization to have [facility]  Please contact to further discuss and advise      Can the clinic reply by [facility]: no     What Number to Call Back if not in [facility]:  [phone]

## 2021-05-13 DIAGNOSIS — E78.00 HYPERCHOLESTEROLEMIA: ICD-10-CM

## 2021-05-13 DIAGNOSIS — F41.9 ANXIETY: ICD-10-CM

## 2021-05-13 DIAGNOSIS — I10 ESSENTIAL HYPERTENSION: Primary | ICD-10-CM

## 2021-05-13 RX ORDER — ATORVASTATIN CALCIUM 10 MG/1
10 TABLET, FILM COATED ORAL DAILY
Qty: 90 TABLET | Refills: 3 | Status: SHIPPED | OUTPATIENT
Start: 2021-05-13 | End: 2022-01-25 | Stop reason: SDUPTHER

## 2021-05-13 RX ORDER — ESCITALOPRAM OXALATE 10 MG/1
10 TABLET ORAL DAILY
Qty: 90 TABLET | Refills: 3 | Status: SHIPPED | OUTPATIENT
Start: 2021-05-13 | End: 2022-01-25 | Stop reason: SDUPTHER

## 2021-05-13 RX ORDER — IRBESARTAN 75 MG/1
75 TABLET ORAL NIGHTLY
Qty: 90 TABLET | Refills: 3 | Status: CANCELLED | OUTPATIENT
Start: 2021-05-13 | End: 2022-05-13

## 2021-05-13 RX ORDER — ATORVASTATIN CALCIUM 10 MG/1
10 TABLET, FILM COATED ORAL DAILY
Qty: 90 TABLET | Refills: 3 | Status: CANCELLED | OUTPATIENT
Start: 2021-05-13

## 2021-05-13 RX ORDER — ESCITALOPRAM OXALATE 10 MG/1
10 TABLET ORAL DAILY
Qty: 90 TABLET | Refills: 3 | Status: CANCELLED | OUTPATIENT
Start: 2021-05-13

## 2021-05-13 RX ORDER — IRBESARTAN 75 MG/1
75 TABLET ORAL NIGHTLY
Qty: 90 TABLET | Refills: 3 | Status: SHIPPED | OUTPATIENT
Start: 2021-05-13 | End: 2022-01-25 | Stop reason: SDUPTHER

## 2021-07-27 ENCOUNTER — LAB VISIT (OUTPATIENT)
Dept: LAB | Facility: OTHER | Age: 56
End: 2021-07-27
Attending: INTERNAL MEDICINE
Payer: COMMERCIAL

## 2021-07-27 ENCOUNTER — OFFICE VISIT (OUTPATIENT)
Dept: INTERNAL MEDICINE | Facility: CLINIC | Age: 56
End: 2021-07-27
Attending: INTERNAL MEDICINE
Payer: COMMERCIAL

## 2021-07-27 VITALS
DIASTOLIC BLOOD PRESSURE: 76 MMHG | WEIGHT: 231.5 LBS | OXYGEN SATURATION: 96 % | BODY MASS INDEX: 29.71 KG/M2 | HEIGHT: 74 IN | SYSTOLIC BLOOD PRESSURE: 130 MMHG | HEART RATE: 80 BPM

## 2021-07-27 DIAGNOSIS — R73.03 PRE-DIABETES: ICD-10-CM

## 2021-07-27 DIAGNOSIS — M79.632 LEFT FOREARM PAIN: Primary | ICD-10-CM

## 2021-07-27 DIAGNOSIS — Z11.4 SCREENING FOR HIV WITHOUT PRESENCE OF RISK FACTORS: ICD-10-CM

## 2021-07-27 DIAGNOSIS — Z12.5 PROSTATE CANCER SCREENING: ICD-10-CM

## 2021-07-27 DIAGNOSIS — I10 ESSENTIAL HYPERTENSION: ICD-10-CM

## 2021-07-27 LAB
ALBUMIN SERPL BCP-MCNC: 4.1 G/DL (ref 3.5–5.2)
ALP SERPL-CCNC: 69 U/L (ref 55–135)
ALT SERPL W/O P-5'-P-CCNC: 26 U/L (ref 10–44)
ANION GAP SERPL CALC-SCNC: 8 MMOL/L (ref 8–16)
AST SERPL-CCNC: 26 U/L (ref 10–40)
BILIRUB SERPL-MCNC: 1 MG/DL (ref 0.1–1)
BUN SERPL-MCNC: 14 MG/DL (ref 6–20)
CALCIUM SERPL-MCNC: 10.2 MG/DL (ref 8.7–10.5)
CHLORIDE SERPL-SCNC: 106 MMOL/L (ref 95–110)
CO2 SERPL-SCNC: 25 MMOL/L (ref 23–29)
CREAT SERPL-MCNC: 0.9 MG/DL (ref 0.5–1.4)
EST. GFR  (AFRICAN AMERICAN): >60 ML/MIN/1.73 M^2
EST. GFR  (NON AFRICAN AMERICAN): >60 ML/MIN/1.73 M^2
GLUCOSE SERPL-MCNC: 116 MG/DL (ref 70–110)
POTASSIUM SERPL-SCNC: 4.1 MMOL/L (ref 3.5–5.1)
PROT SERPL-MCNC: 7.6 G/DL (ref 6–8.4)
SODIUM SERPL-SCNC: 139 MMOL/L (ref 136–145)

## 2021-07-27 PROCEDURE — 99999 PR PBB SHADOW E&M-EST. PATIENT-LVL III: ICD-10-PCS | Mod: PBBFAC,,, | Performed by: INTERNAL MEDICINE

## 2021-07-27 PROCEDURE — 1159F PR MEDICATION LIST DOCUMENTED IN MEDICAL RECORD: ICD-10-PCS | Mod: CPTII,S$GLB,, | Performed by: INTERNAL MEDICINE

## 2021-07-27 PROCEDURE — 87389 HIV-1 AG W/HIV-1&-2 AB AG IA: CPT | Performed by: INTERNAL MEDICINE

## 2021-07-27 PROCEDURE — 1125F PR PAIN SEVERITY QUANTIFIED, PAIN PRESENT: ICD-10-PCS | Mod: CPTII,S$GLB,, | Performed by: INTERNAL MEDICINE

## 2021-07-27 PROCEDURE — 99214 OFFICE O/P EST MOD 30 MIN: CPT | Mod: S$GLB,,, | Performed by: INTERNAL MEDICINE

## 2021-07-27 PROCEDURE — 99999 PR PBB SHADOW E&M-EST. PATIENT-LVL III: CPT | Mod: PBBFAC,,, | Performed by: INTERNAL MEDICINE

## 2021-07-27 PROCEDURE — 3075F SYST BP GE 130 - 139MM HG: CPT | Mod: CPTII,S$GLB,, | Performed by: INTERNAL MEDICINE

## 2021-07-27 PROCEDURE — 1125F AMNT PAIN NOTED PAIN PRSNT: CPT | Mod: CPTII,S$GLB,, | Performed by: INTERNAL MEDICINE

## 2021-07-27 PROCEDURE — 3078F PR MOST RECENT DIASTOLIC BLOOD PRESSURE < 80 MM HG: ICD-10-PCS | Mod: CPTII,S$GLB,, | Performed by: INTERNAL MEDICINE

## 2021-07-27 PROCEDURE — 99214 PR OFFICE/OUTPT VISIT, EST, LEVL IV, 30-39 MIN: ICD-10-PCS | Mod: S$GLB,,, | Performed by: INTERNAL MEDICINE

## 2021-07-27 PROCEDURE — 36415 COLL VENOUS BLD VENIPUNCTURE: CPT | Performed by: INTERNAL MEDICINE

## 2021-07-27 PROCEDURE — 3078F DIAST BP <80 MM HG: CPT | Mod: CPTII,S$GLB,, | Performed by: INTERNAL MEDICINE

## 2021-07-27 PROCEDURE — 3008F PR BODY MASS INDEX (BMI) DOCUMENTED: ICD-10-PCS | Mod: CPTII,S$GLB,, | Performed by: INTERNAL MEDICINE

## 2021-07-27 PROCEDURE — 84153 ASSAY OF PSA TOTAL: CPT | Performed by: INTERNAL MEDICINE

## 2021-07-27 PROCEDURE — 83036 HEMOGLOBIN GLYCOSYLATED A1C: CPT | Performed by: INTERNAL MEDICINE

## 2021-07-27 PROCEDURE — 3008F BODY MASS INDEX DOCD: CPT | Mod: CPTII,S$GLB,, | Performed by: INTERNAL MEDICINE

## 2021-07-27 PROCEDURE — 80053 COMPREHEN METABOLIC PANEL: CPT | Performed by: INTERNAL MEDICINE

## 2021-07-27 PROCEDURE — 1159F MED LIST DOCD IN RCRD: CPT | Mod: CPTII,S$GLB,, | Performed by: INTERNAL MEDICINE

## 2021-07-27 PROCEDURE — 3075F PR MOST RECENT SYSTOLIC BLOOD PRESS GE 130-139MM HG: ICD-10-PCS | Mod: CPTII,S$GLB,, | Performed by: INTERNAL MEDICINE

## 2021-07-28 LAB
COMPLEXED PSA SERPL-MCNC: 0.77 NG/ML (ref 0–4)
ESTIMATED AVG GLUCOSE: 114 MG/DL (ref 68–131)
HBA1C MFR BLD: 5.6 % (ref 4–5.6)
HIV 1+2 AB+HIV1 P24 AG SERPL QL IA: NEGATIVE

## 2021-08-03 DIAGNOSIS — R52 PAIN: Primary | ICD-10-CM

## 2021-09-17 ENCOUNTER — TELEPHONE (OUTPATIENT)
Dept: ORTHOPEDICS | Facility: CLINIC | Age: 56
End: 2021-09-17

## 2021-09-20 ENCOUNTER — DOCUMENTATION ONLY (OUTPATIENT)
Dept: ORTHOPEDICS | Facility: CLINIC | Age: 56
End: 2021-09-20

## 2021-12-13 DIAGNOSIS — D68.51 FACTOR V LEIDEN: ICD-10-CM

## 2021-12-13 RX ORDER — RIVAROXABAN 20 MG/1
TABLET, FILM COATED ORAL
Qty: 90 TABLET | Refills: 3 | Status: SHIPPED | OUTPATIENT
Start: 2021-12-13 | End: 2022-01-25 | Stop reason: SDUPTHER

## 2022-01-25 ENCOUNTER — PATIENT MESSAGE (OUTPATIENT)
Dept: INTERNAL MEDICINE | Facility: CLINIC | Age: 57
End: 2022-01-25

## 2022-01-25 ENCOUNTER — LAB VISIT (OUTPATIENT)
Dept: LAB | Facility: OTHER | Age: 57
End: 2022-01-25
Attending: INTERNAL MEDICINE
Payer: COMMERCIAL

## 2022-01-25 ENCOUNTER — OFFICE VISIT (OUTPATIENT)
Dept: INTERNAL MEDICINE | Facility: CLINIC | Age: 57
End: 2022-01-25
Attending: INTERNAL MEDICINE
Payer: COMMERCIAL

## 2022-01-25 VITALS
BODY MASS INDEX: 30.33 KG/M2 | HEIGHT: 74 IN | HEART RATE: 70 BPM | SYSTOLIC BLOOD PRESSURE: 114 MMHG | OXYGEN SATURATION: 97 % | WEIGHT: 236.31 LBS | DIASTOLIC BLOOD PRESSURE: 80 MMHG

## 2022-01-25 DIAGNOSIS — E78.00 HYPERCHOLESTEROLEMIA: ICD-10-CM

## 2022-01-25 DIAGNOSIS — I10 ESSENTIAL HYPERTENSION: ICD-10-CM

## 2022-01-25 DIAGNOSIS — R73.03 PRE-DIABETES: Primary | ICD-10-CM

## 2022-01-25 DIAGNOSIS — Z15.89 HETEROZYGOUS MTHFR MUTATION A1298C: ICD-10-CM

## 2022-01-25 DIAGNOSIS — D68.51 FACTOR V LEIDEN: ICD-10-CM

## 2022-01-25 DIAGNOSIS — F41.9 ANXIETY: ICD-10-CM

## 2022-01-25 DIAGNOSIS — R73.03 PRE-DIABETES: ICD-10-CM

## 2022-01-25 LAB
ALBUMIN SERPL BCP-MCNC: 3.9 G/DL (ref 3.5–5.2)
ALP SERPL-CCNC: 60 U/L (ref 55–135)
ALT SERPL W/O P-5'-P-CCNC: 25 U/L (ref 10–44)
ANION GAP SERPL CALC-SCNC: 10 MMOL/L (ref 8–16)
AST SERPL-CCNC: 24 U/L (ref 10–40)
BASOPHILS # BLD AUTO: 0.04 K/UL (ref 0–0.2)
BASOPHILS NFR BLD: 1 % (ref 0–1.9)
BILIRUB SERPL-MCNC: 0.7 MG/DL (ref 0.1–1)
BUN SERPL-MCNC: 12 MG/DL (ref 6–20)
CALCIUM SERPL-MCNC: 9.6 MG/DL (ref 8.7–10.5)
CHLORIDE SERPL-SCNC: 104 MMOL/L (ref 95–110)
CHOLEST SERPL-MCNC: 192 MG/DL (ref 120–199)
CHOLEST/HDLC SERPL: 2.5 {RATIO} (ref 2–5)
CO2 SERPL-SCNC: 27 MMOL/L (ref 23–29)
CREAT SERPL-MCNC: 1 MG/DL (ref 0.5–1.4)
DIFFERENTIAL METHOD: ABNORMAL
EOSINOPHIL # BLD AUTO: 0.1 K/UL (ref 0–0.5)
EOSINOPHIL NFR BLD: 2 % (ref 0–8)
ERYTHROCYTE [DISTWIDTH] IN BLOOD BY AUTOMATED COUNT: 13.9 % (ref 11.5–14.5)
EST. GFR  (AFRICAN AMERICAN): >60 ML/MIN/1.73 M^2
EST. GFR  (NON AFRICAN AMERICAN): >60 ML/MIN/1.73 M^2
ESTIMATED AVG GLUCOSE: 114 MG/DL (ref 68–131)
GLUCOSE SERPL-MCNC: 108 MG/DL (ref 70–110)
HBA1C MFR BLD: 5.6 % (ref 4–5.6)
HCT VFR BLD AUTO: 44.5 % (ref 40–54)
HDLC SERPL-MCNC: 78 MG/DL (ref 40–75)
HDLC SERPL: 40.6 % (ref 20–50)
HGB BLD-MCNC: 14.6 G/DL (ref 14–18)
IMM GRANULOCYTES # BLD AUTO: 0.02 K/UL (ref 0–0.04)
IMM GRANULOCYTES NFR BLD AUTO: 0.5 % (ref 0–0.5)
LDLC SERPL CALC-MCNC: 96.8 MG/DL (ref 63–159)
LYMPHOCYTES # BLD AUTO: 1.7 K/UL (ref 1–4.8)
LYMPHOCYTES NFR BLD: 41.6 % (ref 18–48)
MCH RBC QN AUTO: 33.2 PG (ref 27–31)
MCHC RBC AUTO-ENTMCNC: 32.8 G/DL (ref 32–36)
MCV RBC AUTO: 101 FL (ref 82–98)
MONOCYTES # BLD AUTO: 0.5 K/UL (ref 0.3–1)
MONOCYTES NFR BLD: 11.3 % (ref 4–15)
NEUTROPHILS # BLD AUTO: 1.8 K/UL (ref 1.8–7.7)
NEUTROPHILS NFR BLD: 43.6 % (ref 38–73)
NONHDLC SERPL-MCNC: 114 MG/DL
NRBC BLD-RTO: 0 /100 WBC
PLATELET # BLD AUTO: 201 K/UL (ref 150–450)
PMV BLD AUTO: 10.3 FL (ref 9.2–12.9)
POTASSIUM SERPL-SCNC: 4.4 MMOL/L (ref 3.5–5.1)
PROT SERPL-MCNC: 6.6 G/DL (ref 6–8.4)
RBC # BLD AUTO: 4.4 M/UL (ref 4.6–6.2)
SODIUM SERPL-SCNC: 141 MMOL/L (ref 136–145)
TRIGL SERPL-MCNC: 86 MG/DL (ref 30–150)
TSH SERPL DL<=0.005 MIU/L-ACNC: 2.66 UIU/ML (ref 0.4–4)
WBC # BLD AUTO: 4.06 K/UL (ref 3.9–12.7)

## 2022-01-25 PROCEDURE — 36415 COLL VENOUS BLD VENIPUNCTURE: CPT | Performed by: INTERNAL MEDICINE

## 2022-01-25 PROCEDURE — 84443 ASSAY THYROID STIM HORMONE: CPT | Performed by: INTERNAL MEDICINE

## 2022-01-25 PROCEDURE — 99999 PR PBB SHADOW E&M-EST. PATIENT-LVL III: CPT | Mod: PBBFAC,,, | Performed by: INTERNAL MEDICINE

## 2022-01-25 PROCEDURE — 99214 PR OFFICE/OUTPT VISIT, EST, LEVL IV, 30-39 MIN: ICD-10-PCS | Mod: S$GLB,,, | Performed by: INTERNAL MEDICINE

## 2022-01-25 PROCEDURE — 99214 OFFICE O/P EST MOD 30 MIN: CPT | Mod: S$GLB,,, | Performed by: INTERNAL MEDICINE

## 2022-01-25 PROCEDURE — 99999 PR PBB SHADOW E&M-EST. PATIENT-LVL III: ICD-10-PCS | Mod: PBBFAC,,, | Performed by: INTERNAL MEDICINE

## 2022-01-25 PROCEDURE — 85025 COMPLETE CBC W/AUTO DIFF WBC: CPT | Performed by: INTERNAL MEDICINE

## 2022-01-25 PROCEDURE — 80053 COMPREHEN METABOLIC PANEL: CPT | Performed by: INTERNAL MEDICINE

## 2022-01-25 PROCEDURE — 83036 HEMOGLOBIN GLYCOSYLATED A1C: CPT | Performed by: INTERNAL MEDICINE

## 2022-01-25 PROCEDURE — 80061 LIPID PANEL: CPT | Performed by: INTERNAL MEDICINE

## 2022-01-25 RX ORDER — IRBESARTAN 75 MG/1
75 TABLET ORAL NIGHTLY
Qty: 90 TABLET | Refills: 3 | Status: SHIPPED | OUTPATIENT
Start: 2022-01-25 | End: 2023-02-08 | Stop reason: SDUPTHER

## 2022-01-25 RX ORDER — ACETAMINOPHEN, DEXTROMETHORPHAN HBR, DOXYLAMINE SUCCINATE, PHENYLEPHRINE HCL 650; 20; 12.5; 1 MG/30ML; MG/30ML; MG/30ML; MG/30ML
1 SOLUTION ORAL DAILY
Qty: 90 EACH | Refills: 2 | Status: SHIPPED | OUTPATIENT
Start: 2022-01-25

## 2022-01-25 RX ORDER — ESCITALOPRAM OXALATE 10 MG/1
10 TABLET ORAL DAILY
Qty: 90 TABLET | Refills: 3 | Status: SHIPPED | OUTPATIENT
Start: 2022-01-25 | End: 2023-02-08 | Stop reason: SDUPTHER

## 2022-01-25 RX ORDER — ATORVASTATIN CALCIUM 10 MG/1
10 TABLET, FILM COATED ORAL DAILY
Qty: 90 TABLET | Refills: 3 | Status: SHIPPED | OUTPATIENT
Start: 2022-01-25 | End: 2023-02-08 | Stop reason: SDUPTHER

## 2022-01-25 NOTE — PROGRESS NOTES
Subjective:       Patient ID: Anselmo Nava is a 56 y.o. male.    Chief Complaint: No chief complaint on file.    Here for routine f/u    Patient presents today for routine evaluation, physical, and labs. Patient has no major concerns or complaints today.       Left shoulder pain discussed at last visit has resolved.    He has had both shingrix at [facility].       ### preDM ###             HGBA1C                   5.6                 07/27/2021 09:05 AM        HGBA1C                   5.7 (H)             01/26/2021 10:33 AM        HGBA1C                   5.7 (H)             07/28/2020 09:22 AM        HGBA1C                   5.8 (H)             01/30/2020 08:43 AM        HGBA1C                   5.7 (H)             07/30/2019 09:13 AM        HGBA1C                   5.7 (H)             01/29/2019 09:08 AM     ### Factor V leiden ###  Diagnosed PE approx 2016 years prior and sounds like he had thrombolysis done. Follows with cardiologist Princess twice a year along with hematologist jim Simental. On xarelto. Denies bleeding complications. Does report easy bruising but is not bothersome.     ### HTN ###  irbesartan 75   Denies frequent or current HA, intermittent blurry vision, dizziness, CP, or SOB.  Not currently checking BP at home but amenable.    ### HLD ###  Lipitor 10mg       LDLCALC                  103.6               01/26/2021 10:35 AM        LDLCALC                  107.6               01/30/2020 08:43 AM        LDLCALC                  89.8                01/29/2019 09:08 AM        LDLCALC                  88.2                01/31/2018 08:28 AM     Pt is tolerating statin without frequent muscle cramps or diffuse myalgias or weakness.     Due for colonoscopy  [facility]   7/7/15 EGD-ring in lower 1/3 esophagus c/ dilation; Erythema and congestion in antrum and duodenal bulb and irregular Z line consistent with esophagitis   Colonoscopy 3mm polkpy distal descending colon   Rec rpt in 5 yrs. Pt aware  "he is overdue and will contact Gi to schedule. Denies unexplained weight loss, dysphagia or sensation of things sticking in throat, BRBPR, melena, night sweats.      ### Tear of achilles ###   surrounding memorial day. Not complete. Dr Joe Parr orthopedist. Pt not interested in surgery. Pain is intermittent.           Review of Systems   Constitutional: Negative for appetite change, chills, fever and unexpected weight change.   HENT: Negative for hearing loss, sore throat and trouble swallowing.    Eyes: Negative for visual disturbance.   Respiratory: Negative for cough, chest tightness and shortness of breath.    Cardiovascular: Negative for chest pain and leg swelling.   Gastrointestinal: Negative for abdominal pain, blood in stool, constipation, diarrhea, nausea and vomiting.   Endocrine: Negative for polydipsia and polyuria.   Genitourinary: Negative for decreased urine volume, difficulty urinating, dysuria, frequency and urgency.   Musculoskeletal: Negative for gait problem.   Skin: Negative for rash.   Neurological: Negative for dizziness and numbness.   Psychiatric/Behavioral: The patient is not nervous/anxious.        Objective:      Vitals:    01/25/22 0832   BP: 114/80   Pulse: 70   SpO2: 97%   Weight: 107.2 kg (236 lb 5.3 oz)   Height: 6' 2" (1.88 m)      Physical Exam  Constitutional:       General: He is not in acute distress.     Appearance: He is well-developed and well-nourished.   HENT:      Head: Normocephalic and atraumatic.      Mouth/Throat:      Mouth: Oropharynx is clear and moist.      Pharynx: No oropharyngeal exudate.   Eyes:      General: No scleral icterus.     Extraocular Movements: EOM normal.      Conjunctiva/sclera: Conjunctivae normal.      Pupils: Pupils are equal, round, and reactive to light.   Neck:      Thyroid: No thyromegaly.   Cardiovascular:      Rate and Rhythm: Normal rate and regular rhythm.      Heart sounds: Normal heart sounds. No murmur heard.      Pulmonary:    "   Effort: Pulmonary effort is normal.      Breath sounds: Normal breath sounds. No wheezing or rales.   Abdominal:      General: There is no distension.      Palpations: Abdomen is soft.      Tenderness: There is no abdominal tenderness.   Musculoskeletal:         General: No tenderness or edema.   Lymphadenopathy:      Cervical: No cervical adenopathy.   Skin:     General: Skin is warm and dry.   Neurological:      Mental Status: He is alert and oriented to person, place, and time.   Psychiatric:         Mood and Affect: Mood and affect normal.         Behavior: Behavior normal.         Assessment:       1. Pre-diabetes    2. Essential hypertension    3. Anxiety    4. Hypercholesterolemia    5. Factor V Leiden    6. Heterozygous MTHFR mutation [id number]        Plan:       Diagnoses and all orders for this visit:    Pre-diabetes  -     Hemoglobin A1C; Future    Essential hypertension  -     irbesartan (AVAPRO) 75 MG tablet; Take 1 tablet (75 mg total) by mouth every evening.  -     Comprehensive Metabolic Panel; Future  -     Lipid Panel; Future  -     TSH; Future  -     CBC Auto Differential; Future  -     Hemoglobin A1C; Future    Anxiety  -     EScitalopram oxalate (LEXAPRO) 10 MG tablet; Take 1 tablet (10 mg total) by mouth once daily.    Hypercholesterolemia  -     atorvastatin (LIPITOR) 10 MG tablet; Take 1 tablet (10 mg total) by mouth once daily.  -     Lipid Panel; Future    Factor V Leiden  -     rivaroxaban (XARELTO) 20 mg Tab; Take 1 tablet (20 mg total) by mouth once daily.  -     CBC Auto Differential; Future    Heterozygous MTHFR mutation [id number]   Dr Culp hematology. Controlled and asymptomatic.  Continue current Rx regimen.    Other orders  -     cyanocobalamin, vitamin B-12, 1,000 mcg TbSR; Take 1 tablet by mouth once daily.           Mickey Savage MD  Internal Medicine-[facility]        Side effects of medication(s) were discussed in detail and patient voiced understanding.  Patient will call  back for any issues or complications.

## 2022-03-23 ENCOUNTER — OFFICE VISIT (OUTPATIENT)
Dept: URGENT CARE | Facility: CLINIC | Age: 57
End: 2022-03-23
Payer: COMMERCIAL

## 2022-03-23 VITALS
RESPIRATION RATE: 20 BRPM | TEMPERATURE: 97 F | OXYGEN SATURATION: 99 % | SYSTOLIC BLOOD PRESSURE: 120 MMHG | DIASTOLIC BLOOD PRESSURE: 84 MMHG | HEART RATE: 100 BPM

## 2022-03-23 DIAGNOSIS — R53.83 FATIGUE, UNSPECIFIED TYPE: ICD-10-CM

## 2022-03-23 DIAGNOSIS — J06.9 VIRAL UPPER RESPIRATORY INFECTION: Primary | ICD-10-CM

## 2022-03-23 LAB
CTP QC/QA: YES
SARS-COV-2 RDRP RESP QL NAA+PROBE: NEGATIVE

## 2022-03-23 PROCEDURE — 99203 OFFICE O/P NEW LOW 30 MIN: CPT | Mod: S$GLB,CS,,

## 2022-03-23 PROCEDURE — 99203 PR OFFICE/OUTPT VISIT, NEW, LEVL III, 30-44 MIN: ICD-10-PCS | Mod: S$GLB,CS,,

## 2022-03-23 PROCEDURE — U0002 COVID-19 LAB TEST NON-CDC: HCPCS | Mod: QW,S$GLB,,

## 2022-03-23 PROCEDURE — U0002: ICD-10-PCS | Mod: QW,S$GLB,,

## 2022-03-23 NOTE — PATIENT INSTRUCTIONS
PLEASE READ YOUR DISCHARGE INSTRUCTIONS ENTIRELY AS IT CONTAINS IMPORTANT INFORMATION.      Please drink plenty of fluids.    Please get plenty of rest.    Please return here or go to the Emergency Department for any concerns or worsening of condition.    Please take an over the counter antihistamine medication (allegra/Claritin/Zyrtec) of your choice as directed for allergy symptoms and/or runny nose and postnasal drip.    Try an over the counter decongestant for sinus pressure/ear pressure, congestion symptoms like Mucinex D or Sudafed or Phenylephrine. You buy this behind the pharmacy counter    If you do have Hypertension or palpitations, it is safe to take Coricidin HBP for relief of sinus symptoms.    Tylenol or ibuprofen can also be used as directed for pain and fever unless you have an allergy to them or medical condition such as stomach ulcers, kidney or liver disease or blood thinners etc for which you should not be taking these type of medications.     Sore throat recommendations: Warm fluids, warm salt water gargles, throat lozenges, tea, honey, soup, rest, hydration.    Use over the counter flonase or nasocort: one spray each nostril twice daily OR two sprays each nostril once daily until nares dry out, unless you have Glaucoma.   Can also supplement with nasal saline rinse.    Sinus rinses DO NOT USE TAP WATER, if you must, water must be a rolling boil for 1 minute, let it cool, then use.  May use distilled water, or over the counter nasal saline rinses.  Vics vapor rub in shower to help open nasal passages.  May use nasal gel to keep passages moisturized.  May use Nasal saline sprays during the day for added relief of congestion.   For those who go to the gym, please do not use the sauna or steam room now to clear sinuses.      Cough     Rest and fluids are important  Can use honey with louis to soothe your throat    Robitussin or Delsyum for cough suppressant for dry cough.    Mucinex DM or products  containing Guaifenesin or Dextromethorphan for expectorant (wet cough).    Take prescription cough meds (pills) as prescribed; take prescription cough syrup at night as needed for cough.  Do not take both the prescribed cough pills and syrup at the same time or within 6 hours of each other.  Do not take the cough syrup with any other sedative medication as it can can cause drowsiness. Do not operate any heavy machinery, drink or drive while taking the cough syrup.     Please follow up with your primary care doctor or specialist in the next 48-72hrs as needed and if no improvement    If you  smoke, please stop smoking.      Please return or see your primary care doctor if you develop new or worsening symptoms.     Please arrange follow up with your primary medical clinic as soon as possible. You must understand that you've received an Urgent Care treatment only and that you may be released before all of your medical problems are known or treated. You, the patient, will arrange for follow up as instructed. If your symptoms worsen or fail to improve you should go to the Emergency Room.

## 2022-03-23 NOTE — PROGRESS NOTES
Subjective:       Patient ID: Anselmo Nava is a 56 y.o. male.    Vitals:  temperature is 97.3 °F (36.3 °C). His blood pressure is 120/84 and his pulse is 100. His respiration is 20 and oxygen saturation is 99%.     Chief Complaint: Fatigue    56-year-old male with past medical history of hypertension presents with complaints of  Fatigue, cough, runny nose, body aches for 4 days. He has not taken any medications for his symptoms. He denies fever, chills, chest pain, shortness of breath, sore throat, congestion. Patient requests a rapid covid test.  Patient states that he seems to feel lethargic.  No changes to activity, appetite or sleep schedule.  He denies any recent weight loss.  Patient's states that he does not get a lot of sleep at night.  Vitals are stable.  Patient in no acute distress.      Fatigue  This is a new problem. Episode onset: Saturday. Associated symptoms include chills, congestion, coughing, fatigue and myalgias. Pertinent negatives include no chest pain, diaphoresis, fever, nausea or sore throat. He has tried nothing for the symptoms.       Constitution: Positive for chills and fatigue. Negative for sweating and fever.   HENT: Positive for congestion. Negative for ear pain, ear discharge, sinus pain, sinus pressure and sore throat.    Cardiovascular: Negative for chest pain and sob on exertion.   Respiratory: Positive for cough. Negative for shortness of breath.    Gastrointestinal: Negative for nausea.   Musculoskeletal: Positive for muscle ache.       Objective:      Physical Exam   Constitutional: He is oriented to person, place, and time. He appears well-developed. He is cooperative.  Non-toxic appearance. He does not appear ill. No distress.   HENT:   Head: Normocephalic and atraumatic.   Ears:   Right Ear: Hearing, external ear and ear canal normal. Tympanic membrane is not injected, not erythematous, not retracted and not bulging. A middle ear effusion is present.   Left Ear: Hearing,  external ear and ear canal normal. Tympanic membrane is not injected, not erythematous, not retracted and not bulging. A middle ear effusion is present.   Nose: Mucosal edema present. No rhinorrhea, nasal deformity or congestion. No epistaxis. Right sinus exhibits no maxillary sinus tenderness and no frontal sinus tenderness. Left sinus exhibits no maxillary sinus tenderness and no frontal sinus tenderness.   Mouth/Throat: Uvula is midline, oropharynx is clear and moist and mucous membranes are normal. No trismus in the jaw. Normal dentition. No uvula swelling. No posterior oropharyngeal erythema.   Eyes: Conjunctivae and lids are normal. Right eye exhibits no discharge. Left eye exhibits no discharge. No scleral icterus.   Neck: Trachea normal and phonation normal. Neck supple.   Cardiovascular: Normal rate, regular rhythm, normal heart sounds and normal pulses.   Pulmonary/Chest: Effort normal and breath sounds normal. No respiratory distress.   Abdominal: Normal appearance and bowel sounds are normal. He exhibits no distension and no mass. Soft. There is no abdominal tenderness.   Musculoskeletal: Normal range of motion.         General: No deformity. Normal range of motion.   Neurological: He is alert and oriented to person, place, and time. He exhibits normal muscle tone. Coordination normal.   Skin: Skin is warm, dry, intact, not diaphoretic and not pale.   Psychiatric: His speech is normal and behavior is normal. Judgment and thought content normal.   Nursing note and vitals reviewed.        Results for orders placed or performed in visit on 03/23/22   POCT COVID-19 Rapid Screening   Result Value Ref Range    POC Rapid COVID Negative Negative     Acceptable Yes        Assessment:       1. Viral upper respiratory infection    2. Fatigue, unspecified type          Plan:        Discussed negative COVID test results with patient.  Recommended the patient begin over-the-counter treatments for  symptom relief.  Follow-up with PCP if symptoms of fatigue continue.  Vitals are stable.  Patient in no acute distress.    Viral upper respiratory infection    Fatigue, unspecified type  -     POCT COVID-19 Rapid Screening

## 2022-07-29 ENCOUNTER — TELEPHONE (OUTPATIENT)
Dept: SPORTS MEDICINE | Facility: CLINIC | Age: 57
End: 2022-07-29
Payer: COMMERCIAL

## 2022-07-29 DIAGNOSIS — M25.561 RIGHT KNEE PAIN, UNSPECIFIED CHRONICITY: Primary | ICD-10-CM

## 2022-08-03 ENCOUNTER — OFFICE VISIT (OUTPATIENT)
Dept: SPORTS MEDICINE | Facility: CLINIC | Age: 57
End: 2022-08-03
Payer: COMMERCIAL

## 2022-08-03 ENCOUNTER — HOSPITAL ENCOUNTER (OUTPATIENT)
Dept: RADIOLOGY | Facility: HOSPITAL | Age: 57
Discharge: HOME | End: 2022-08-03
Attending: PHYSICIAN ASSISTANT
Payer: COMMERCIAL

## 2022-08-03 VITALS
HEIGHT: 74 IN | WEIGHT: 232.13 LBS | BODY MASS INDEX: 29.79 KG/M2 | HEART RATE: 74 BPM | SYSTOLIC BLOOD PRESSURE: 130 MMHG | DIASTOLIC BLOOD PRESSURE: 93 MMHG

## 2022-08-03 DIAGNOSIS — M25.561 ACUTE PAIN OF RIGHT KNEE: Primary | ICD-10-CM

## 2022-08-03 DIAGNOSIS — M25.511 ACUTE PAIN OF RIGHT SHOULDER: ICD-10-CM

## 2022-08-03 DIAGNOSIS — M25.561 RIGHT KNEE PAIN, UNSPECIFIED CHRONICITY: ICD-10-CM

## 2022-08-03 PROCEDURE — 99204 PR OFFICE/OUTPT VISIT, NEW, LEVL IV, 45-59 MIN: ICD-10-PCS | Mod: S$GLB,,, | Performed by: PHYSICIAN ASSISTANT

## 2022-08-03 PROCEDURE — 73564 X-RAY EXAM KNEE 4 OR MORE: CPT | Mod: 26,50,, | Performed by: RADIOLOGY

## 2022-08-03 PROCEDURE — 99999 PR PBB SHADOW E&M-EST. PATIENT-LVL III: ICD-10-PCS | Mod: PBBFAC,,, | Performed by: PHYSICIAN ASSISTANT

## 2022-08-03 PROCEDURE — 73564 X-RAY EXAM KNEE 4 OR MORE: CPT | Mod: TC,50,PN

## 2022-08-03 PROCEDURE — 99999 PR PBB SHADOW E&M-EST. PATIENT-LVL III: CPT | Mod: PBBFAC,,, | Performed by: PHYSICIAN ASSISTANT

## 2022-08-03 PROCEDURE — 99204 OFFICE O/P NEW MOD 45 MIN: CPT | Mod: S$GLB,,, | Performed by: PHYSICIAN ASSISTANT

## 2022-08-03 PROCEDURE — 73564 XR KNEE ORTHO BILAT WITH FLEXION: ICD-10-PCS | Mod: 26,50,, | Performed by: RADIOLOGY
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
## 2023-01-17 ENCOUNTER — HOSPITAL ENCOUNTER (EMERGENCY)
Facility: OTHER | Age: 58
Discharge: HOME | End: 2023-01-17
Attending: EMERGENCY MEDICINE
Payer: COMMERCIAL

## 2023-01-17 ENCOUNTER — NURSE TRIAGE (OUTPATIENT)
Dept: ADMINISTRATIVE | Facility: CLINIC | Age: 58
End: 2023-01-17
Payer: COMMERCIAL

## 2023-01-17 VITALS
HEART RATE: 73 BPM | WEIGHT: 235 LBS | RESPIRATION RATE: 19 BRPM | TEMPERATURE: 98 F | OXYGEN SATURATION: 98 % | DIASTOLIC BLOOD PRESSURE: 87 MMHG | HEIGHT: 74 IN | BODY MASS INDEX: 30.16 KG/M2 | SYSTOLIC BLOOD PRESSURE: 140 MMHG

## 2023-01-17 DIAGNOSIS — R00.2 PALPITATIONS: Primary | ICD-10-CM

## 2023-01-17 DIAGNOSIS — R07.9 CHEST PAIN: ICD-10-CM

## 2023-01-17 LAB
ALBUMIN SERPL BCP-MCNC: 3.9 G/DL (ref 3.5–5.2)
ALP SERPL-CCNC: 65 U/L (ref 55–135)
ALT SERPL W/O P-5'-P-CCNC: 34 U/L (ref 10–44)
ANION GAP SERPL CALC-SCNC: 8 MMOL/L (ref 8–16)
AST SERPL-CCNC: 40 U/L (ref 10–40)
BASOPHILS # BLD AUTO: 0.02 K/UL (ref 0–0.2)
BASOPHILS NFR BLD: 0.4 % (ref 0–1.9)
BILIRUB SERPL-MCNC: 0.5 MG/DL (ref 0.1–1)
BNP SERPL-MCNC: 31 PG/ML (ref 0–99)
BUN SERPL-MCNC: 13 MG/DL (ref 6–20)
CALCIUM SERPL-MCNC: 9.6 MG/DL (ref 8.7–10.5)
CHLORIDE SERPL-SCNC: 106 MMOL/L (ref 95–110)
CO2 SERPL-SCNC: 25 MMOL/L (ref 23–29)
CREAT SERPL-MCNC: 1.1 MG/DL (ref 0.5–1.4)
DIFFERENTIAL METHOD: ABNORMAL
EOSINOPHIL # BLD AUTO: 0.1 K/UL (ref 0–0.5)
EOSINOPHIL NFR BLD: 1.4 % (ref 0–8)
ERYTHROCYTE [DISTWIDTH] IN BLOOD BY AUTOMATED COUNT: 13.6 % (ref 11.5–14.5)
EST. GFR  (NO RACE VARIABLE): >60 ML/MIN/1.73 M^2
GLUCOSE SERPL-MCNC: 157 MG/DL (ref 70–110)
HCT VFR BLD AUTO: 43.9 % (ref 40–54)
HCV AB SERPL QL IA: NEGATIVE
HGB BLD-MCNC: 15 G/DL (ref 14–18)
HIV 1+2 AB+HIV1 P24 AG SERPL QL IA: NEGATIVE
IMM GRANULOCYTES # BLD AUTO: 0.01 K/UL (ref 0–0.04)
IMM GRANULOCYTES NFR BLD AUTO: 0.2 % (ref 0–0.5)
LYMPHOCYTES # BLD AUTO: 0.7 K/UL (ref 1–4.8)
LYMPHOCYTES NFR BLD: 14.7 % (ref 18–48)
MCH RBC QN AUTO: 34.2 PG (ref 27–31)
MCHC RBC AUTO-ENTMCNC: 34.2 G/DL (ref 32–36)
MCV RBC AUTO: 100 FL (ref 82–98)
MONOCYTES # BLD AUTO: 0.5 K/UL (ref 0.3–1)
MONOCYTES NFR BLD: 9.9 % (ref 4–15)
NEUTROPHILS # BLD AUTO: 3.7 K/UL (ref 1.8–7.7)
NEUTROPHILS NFR BLD: 73.4 % (ref 38–73)
NRBC BLD-RTO: 0 /100 WBC
PLATELET # BLD AUTO: 204 K/UL (ref 150–450)
PMV BLD AUTO: 10 FL (ref 9.2–12.9)
POTASSIUM SERPL-SCNC: 4.2 MMOL/L (ref 3.5–5.1)
PROT SERPL-MCNC: 7.3 G/DL (ref 6–8.4)
RBC # BLD AUTO: 4.39 M/UL (ref 4.6–6.2)
SODIUM SERPL-SCNC: 139 MMOL/L (ref 136–145)
TROPONIN I SERPL DL<=0.01 NG/ML-MCNC: 0.01 NG/ML (ref 0–0.03)
TROPONIN I SERPL DL<=0.01 NG/ML-MCNC: <0.006 NG/ML (ref 0–0.03)
TSH SERPL DL<=0.005 MIU/L-ACNC: 3.33 UIU/ML (ref 0.4–4)
WBC # BLD AUTO: 4.97 K/UL (ref 3.9–12.7)

## 2023-01-17 PROCEDURE — 87389 HIV-1 AG W/HIV-1&-2 AB AG IA: CPT | Performed by: PHYSICIAN ASSISTANT

## 2023-01-17 PROCEDURE — 93005 ELECTROCARDIOGRAM TRACING: CPT

## 2023-01-17 PROCEDURE — 25000003 PHARM REV CODE 250: Performed by: PHYSICIAN ASSISTANT

## 2023-01-17 PROCEDURE — 86803 HEPATITIS C AB TEST: CPT | Performed by: PHYSICIAN ASSISTANT

## 2023-01-17 PROCEDURE — 93010 ELECTROCARDIOGRAM REPORT: CPT | Mod: 76,,, | Performed by: INTERNAL MEDICINE

## 2023-01-17 PROCEDURE — 85025 COMPLETE CBC W/AUTO DIFF WBC: CPT | Performed by: PHYSICIAN ASSISTANT

## 2023-01-17 PROCEDURE — 99285 EMERGENCY DEPT VISIT HI MDM: CPT | Mod: 25

## 2023-01-17 PROCEDURE — 84484 ASSAY OF TROPONIN QUANT: CPT | Performed by: PHYSICIAN ASSISTANT

## 2023-01-17 PROCEDURE — 93010 EKG 12-LEAD: ICD-10-PCS | Mod: ,,, | Performed by: INTERNAL MEDICINE

## 2023-01-17 PROCEDURE — 80053 COMPREHEN METABOLIC PANEL: CPT | Performed by: PHYSICIAN ASSISTANT

## 2023-01-17 PROCEDURE — 83880 ASSAY OF NATRIURETIC PEPTIDE: CPT | Performed by: PHYSICIAN ASSISTANT

## 2023-01-17 PROCEDURE — 93010 ELECTROCARDIOGRAM REPORT: CPT | Mod: ,,, | Performed by: INTERNAL MEDICINE

## 2023-01-17 PROCEDURE — 96360 HYDRATION IV INFUSION INIT: CPT

## 2023-01-17 PROCEDURE — 84443 ASSAY THYROID STIM HORMONE: CPT | Performed by: PHYSICIAN ASSISTANT

## 2023-01-17 RX ORDER — ASPIRIN 325 MG
325 TABLET ORAL
Status: COMPLETED | OUTPATIENT
Start: 2023-01-17 | End: 2023-01-17

## 2023-01-17 RX ADMIN — SODIUM CHLORIDE 1000 ML: 9 INJECTION, SOLUTION INTRAVENOUS at 03:01

## 2023-01-17 RX ADMIN — ASPIRIN 325 MG ORAL TABLET 325 MG: 325 PILL ORAL at 03:01

## 2023-01-17 NOTE — TELEPHONE ENCOUNTER
Pt called incoming with c/o heart fluttering and felt like beating faster that normal pt triaged for heart rate question and then said he had heaviness on left side moved to  protocol and he was told for care advice to call 911 and wanted to know if he could drive and told again the care advice to call 911 and he said that he was 3 blocks from the hospital. Pt told to call back once home and he said that he was driving himself                Reason for Disposition   Chest pain lasting longer than 5 minutes and ANY of the following:* Over 44 years old* Over 30 years old and at least one cardiac risk factor (e.g., diabetes mellitus, high blood pressure, high cholesterol, smoker, or strong family history of heart disease)* History of heart disease (i.e., angina, heart attack, heart failure, bypass surgery, takes nitroglycerin)* Pain is crushing, pressure-like, or heavy    Additional Information   Negative: Passed out (i.e., lost consciousness, collapsed and was not responding)   Negative: Shock suspected (e.g., cold/pale/clammy skin, too weak to stand, low BP, rapid pulse)   Negative: Difficult to awaken or acting confused (e.g., disoriented, slurred speech)   Negative: Visible sweat on face or sweat dripping down face   Negative: Unable to walk, or can only walk with assistance (e.g., requires support)   Negative: Received SHOCK from implantable cardiac defibrillator and has persisting symptoms (i.e., palpitations, lightheadedness)   Negative: Dizziness, lightheadedness, or weakness and heart beating very rapidly (e.g., > 140 / minute)   Negative: Dizziness, lightheadedness, or weakness and heart beating very slowly (e.g., < 50 / minute)   Negative: Sounds like a life-threatening emergency to the triager   Chest pain   Negative: SEVERE difficulty breathing (e.g., struggling for each breath, speaks in single words)   Negative: Passed out (i.e., fainted, collapsed and was not responding)   Negative: Shock suspected  (e.g., cold/pale/clammy skin, too weak to stand, low BP, rapid pulse)    Protocols used: Heart Rate and Heartbeat [id number], Chest Pain-A-OH

## 2023-01-17 NOTE — ED PROVIDER NOTES
"     Source of History:  Patient    Chief complaint:  Irregular Heart Beat (Pt presented to ER with c/o heart fluttering and intermittent chest pain since Sunday. )      HPI:  Anselmo Nava is a 57 y.o. male with hypertension, prediabetes, anxiety, hypercholesterolemia, factor 5 Leiden, history of PE on chronic anticoagulation who is presenting to the emergency department with chest pain and heart palpitations.  Symptoms have been intermittent over the past 2-3 days.  Feels like his heart is racing and fluttering and has a pressure sensation in the left side of his chest.  Pain does not radiate to jaw or back.  Will sometimes have episodes associated with diaphoresis.  Denies dyspnea on exertion or leg swelling.  Reports compliance with Xarelto except for 2 weeks ago when he missed 2 doses.  He is had similar chest pain and palpitations in the past but is unclear the cause.  Wife states he went to a hunting camp this weekend where he drank alcohol and had lack of sleep.  Denies increase caffeine use.  States he had a stress test many years ago that was normal.  This is the extent to the patients complaints today here in the emergency department.    ROS: As per HPI    Review of patient's allergies indicates:  No Known Allergies    PMH:  As per HPI and below:  Past Medical History:   Diagnosis Date    Anxiety      No past surgical history on file.    Social History     Tobacco Use    Smoking status: Never    Smokeless tobacco: Never   Substance Use Topics    Alcohol use: Yes    Drug use: No       Physical Exam:    BP (!) 183/99 (BP Location: Left arm, Patient Position: Sitting)   Pulse 110   Temp 98.3 °F (36.8 °C) (Oral)   Resp 18   Ht 6' 2" (1.88 m)   Wt 106.6 kg (235 lb)   SpO2 99%   BMI 30.17 kg/m²   Nursing note and vital signs reviewed.  Appearance: No acute distress. Non toxic appearing  Eyes: No conjunctival injection.  ENT: Oropharynx clear.    Chest/ Respiratory: Clear to auscultation bilaterally.  " Good air movement.  No wheezes.  No rhonchi. No rales. No accessory muscle use.  Cardiovascular: Tachycardic with regular rhythm.  No murmurs. No gallops. No rubs.  Abdomen: Soft.  Not distended.  Nontender.  No guarding.  No rebound. Non-peritoneal.  Musculoskeletal: Good range of motion all joints.  No deformities.  Neck supple.  No meningismus.  Trace lower extremity edema to tib fib area bilaterally   Skin: No rashes seen.  Good turgor.  No abrasions.  No ecchymoses.  Neurologic: Motor intact.  Sensation intact.   Mental Status:  Alert and oriented x 3.  Appropriate, conversant.   Labs that have been ordered have been independently reviewed and interpreted by myself.    EKG:  Sinus tachycardia rate of 114 beats per minute.  No STEMI.  Possible LVH.  No prior EKG to compare to.    I decided to obtain the patient's medical records.  Summary of Medical Records:  No prior cardiac procedures, appointments able to access.  No recent hospitalizations.    MDM/ Differential Dx:    57 y.o. male with hypertension, prediabetes, anxiety, hypercholesterolemia, factor 5 Leiden, history of PE on chronic anticoagulation who is presenting to the emergency department with palpitations and chest pain.  Patient is afebrile, nontoxic appearing hemodynamically stable.  EKG sinus rhythm.  Vital signs normal aside from elevated blood pressure.  Differential includes ACS, dehydration, metabolic derangement, anxiety reaction, arrhythmia.  Will place on cardiac monitoring, obtain cardiac workup, provide IV fluids and reassess.  ED Course as of 01/17/23 2003 Tue Jan 17, 2023   1719 Repeat EKG from 1657  Normal sinus rhythm at a rate of 71 beats per minute.  No STEMI.  No ischemic changes.  Possible LVH. [AG]      ED Course User Index  [AG] John Batista PA-C   Chest x-ray radiology interpretation with streaky opacity of left lung base which may represent atelectasis.  He has no signs or symptoms to suggest lower respiratory  infection.  Blood work without significant abnormalities.  Troponin x2 negative.  Patient has a heart score of 3.  He did not have repeat chest pain while here in the ED.  No events on cardiac monitoring.  Advised close follow-up with PCP, strict return precautions to the ED.        Diagnostic Impression:    1. Palpitations    2. Chest pain                   John Batista PA-C  01/17/23 2005

## 2023-01-17 NOTE — ED TRIAGE NOTES
Pt c/o several days of CP worsening today. States it is gone now but it is occasionally comes and goes. Associated with psychosocial stress and SAHNI. Hx of PE on thinners.

## 2023-01-19 ENCOUNTER — OFFICE VISIT (OUTPATIENT)
Dept: INTERNAL MEDICINE | Facility: CLINIC | Age: 58
End: 2023-01-19
Attending: INTERNAL MEDICINE
Payer: COMMERCIAL

## 2023-01-19 ENCOUNTER — LAB VISIT (OUTPATIENT)
Dept: LAB | Facility: OTHER | Age: 58
End: 2023-01-19
Attending: INTERNAL MEDICINE
Payer: COMMERCIAL

## 2023-01-19 VITALS
HEART RATE: 86 BPM | OXYGEN SATURATION: 97 % | SYSTOLIC BLOOD PRESSURE: 118 MMHG | BODY MASS INDEX: 30.81 KG/M2 | HEIGHT: 74 IN | WEIGHT: 240.06 LBS | DIASTOLIC BLOOD PRESSURE: 80 MMHG

## 2023-01-19 DIAGNOSIS — Z12.11 COLON CANCER SCREENING: Primary | ICD-10-CM

## 2023-01-19 DIAGNOSIS — Z12.5 PROSTATE CANCER SCREENING: ICD-10-CM

## 2023-01-19 DIAGNOSIS — G47.33 OSA (OBSTRUCTIVE SLEEP APNEA): ICD-10-CM

## 2023-01-19 DIAGNOSIS — R00.2 PALPITATIONS: ICD-10-CM

## 2023-01-19 PROCEDURE — 84153 ASSAY OF PSA TOTAL: CPT | Performed by: INTERNAL MEDICINE

## 2023-01-19 PROCEDURE — 99214 OFFICE O/P EST MOD 30 MIN: CPT | Mod: S$GLB,,, | Performed by: INTERNAL MEDICINE

## 2023-01-19 PROCEDURE — 99214 PR OFFICE/OUTPT VISIT, EST, LEVL IV, 30-39 MIN: ICD-10-PCS | Mod: S$GLB,,, | Performed by: INTERNAL MEDICINE

## 2023-01-19 PROCEDURE — 99999 PR PBB SHADOW E&M-EST. PATIENT-LVL IV: ICD-10-PCS | Mod: PBBFAC,,, | Performed by: INTERNAL MEDICINE

## 2023-01-19 PROCEDURE — 99999 PR PBB SHADOW E&M-EST. PATIENT-LVL IV: CPT | Mod: PBBFAC,,, | Performed by: INTERNAL MEDICINE

## 2023-01-19 PROCEDURE — 36415 COLL VENOUS BLD VENIPUNCTURE: CPT | Performed by: INTERNAL MEDICINE

## 2023-01-19 NOTE — PROGRESS NOTES
Subjective:       Patient ID: Anselmo Nava is a 57 y.o. male.    Chief Complaint: Follow-up    Here for ED f/u      58 yo M with PMHx of Factor V, PE, HTN, HLD,     Recently seen in ED for 2 day hx of CP and palpitations following a hunting trip. Rhythm and troponin normal. No recurrent episodes. ROS + for witnessed apneic episodes and excessive disruptive snoring.      Left arm pain for past several months. Pain is centered around elbow and forearm, aching, in nature with acute, severe exacerbation while playing golf. Reports numbness itngling of elft hand at night while sleeping with head on fully flexed elbow. Denies neck or shoulder pain. Denies swelling. No ttp. No daytime paresthesias     ### Factor V leiden- Hx PE ###  Diagnosed PE about 5 years prior and sounds like he had thrombolysis done. Follows with cardiologist Princess twice a year along with hematologist jim Simental. On xarelto. Denies bleeding complications. Does report easy bruising but is not bothersome.        ### HLD ###  Pt is tolerating statin without frequent muscle cramps or diffuse myalgias or weakness.     ### HM ###  [facility]   7/7/15 EGD-ring in lower 1/3 esophagus c/ dilation; Erythema and congestion in antrum and duodenal bulb and irregular Z line consistent with esophagitis   Colonoscopy 3mm polkpy distal descending colon   Rec rpt in 5 yrs. Pt aware he is overdue and will contact Gi to schedule. Denies unexplained weight loss, dysphagia or sensation of things sticking in throat, BRBPR, melena, night sweats.      ### Tear of achilles surrounding memorial day. Not complete. Dr Joe Parr orthopedist. Pt not interested in surgery. Pain is intermittent.       Review of Systems   Constitutional:  Negative for appetite change, chills, fever and unexpected weight change.   HENT:  Negative for hearing loss, sore throat and trouble swallowing.    Eyes:  Negative for visual disturbance.   Respiratory:  Negative for cough,  "chest tightness and shortness of breath.    Cardiovascular:  Negative for chest pain and leg swelling.   Gastrointestinal:  Negative for abdominal pain, blood in stool, constipation, diarrhea, nausea and vomiting.   Endocrine: Negative for polydipsia and polyuria.   Genitourinary:  Negative for decreased urine volume, difficulty urinating, dysuria, frequency and urgency.   Musculoskeletal:  Negative for gait problem.   Skin:  Negative for rash.   Neurological:  Negative for dizziness and numbness.   Psychiatric/Behavioral:  The patient is not nervous/anxious.      Objective:      Vitals:    01/19/23 1522   BP: 118/80   Pulse: 86   SpO2: 97%   Weight: 108.9 kg (240 lb 1.3 oz)   Height: 6' 2" (1.88 m)      Physical Exam  Vitals and nursing note reviewed.   Constitutional:       General: He is not in acute distress.     Appearance: Normal appearance. He is well-developed.   HENT:      Head: Normocephalic and atraumatic.      Mouth/Throat:      Pharynx: No oropharyngeal exudate.   Eyes:      General: No scleral icterus.     Conjunctiva/sclera: Conjunctivae normal.      Pupils: Pupils are equal, round, and reactive to light.   Neck:      Thyroid: No thyromegaly.   Cardiovascular:      Rate and Rhythm: Normal rate and regular rhythm.      Heart sounds: Normal heart sounds. No murmur heard.  Pulmonary:      Effort: Pulmonary effort is normal.      Breath sounds: Normal breath sounds. No wheezing or rales.   Abdominal:      General: There is no distension.   Musculoskeletal:         General: No tenderness.   Lymphadenopathy:      Cervical: No cervical adenopathy.   Skin:     General: Skin is warm and dry.   Neurological:      Mental Status: He is alert and oriented to person, place, and time.   Psychiatric:         Behavior: Behavior normal.       Assessment:       1. Colon cancer screening    2. Prostate cancer screening    3. Palpitations    4. DARCIE (obstructive sleep apnea)          Plan:       Anselmo was seen today for " follow-up.    Diagnoses and all orders for this visit:    Colon cancer screening  -     Ambulatory referral/consult to Endo Procedure ; Future    Prostate cancer screening  -     PSA, Screening; Future    Palpitations  -     Echo  -     Holter monitor - 48 hour; Future  -     Echo  -     Holter monitor - 48 hour; Future    DARCIE (obstructive sleep apnea)  -     Ambulatory referral/consult to Sleep Disorders; Future           Mickey Savage MD  Internal Medicine-[facility]        Side effects of medication(s) were discussed in detail and patient voiced understanding.  Patient will call back for any issues or complications.

## 2023-01-20 LAB — COMPLEXED PSA SERPL-MCNC: 0.74 NG/ML (ref 0–4)

## 2023-01-31 ENCOUNTER — HOSPITAL ENCOUNTER (OUTPATIENT)
Dept: CARDIOLOGY | Facility: OTHER | Age: 58
Discharge: HOME | End: 2023-01-31
Attending: INTERNAL MEDICINE
Payer: COMMERCIAL

## 2023-01-31 VITALS
SYSTOLIC BLOOD PRESSURE: 118 MMHG | DIASTOLIC BLOOD PRESSURE: 80 MMHG | WEIGHT: 240 LBS | BODY MASS INDEX: 30.8 KG/M2 | HEIGHT: 74 IN

## 2023-01-31 DIAGNOSIS — R00.2 PALPITATIONS: ICD-10-CM

## 2023-01-31 LAB
ASCENDING AORTA: 3.69 CM
AV INDEX (PROSTH): 0.8
AV MEAN GRADIENT: 4 MMHG
AV PEAK GRADIENT: 5 MMHG
AV VALVE AREA: 3.28 CM2
AV VELOCITY RATIO: 0.99
BSA FOR ECHO PROCEDURE: 2.38 M2
CV ECHO LV RWT: 0.41 CM
DOP CALC AO PEAK VEL: 1.16 M/S
DOP CALC AO VTI: 26.6 CM
DOP CALC LVOT AREA: 4.1 CM2
DOP CALC LVOT DIAMETER: 2.28 CM
DOP CALC LVOT PEAK VEL: 1.15 M/S
DOP CALC LVOT STROKE VOLUME: 87.33 CM3
DOP CALCLVOT PEAK VEL VTI: 21.4 CM
E WAVE DECELERATION TIME: 228.3 MSEC
E/A RATIO: 0.64
E/E' RATIO: 6.38 M/S
ECHO LV POSTERIOR WALL: 1.03 CM (ref 0.6–1.1)
EJECTION FRACTION: 55 %
FRACTIONAL SHORTENING: 30 % (ref 28–44)
INTERVENTRICULAR SEPTUM: 1.01 CM (ref 0.6–1.1)
IVC DIAMETER: 1.95 CM
IVRT: 124.57 MSEC
LA MAJOR: 6.02 CM
LA MINOR: 6.27 CM
LA WIDTH: 4.3 CM
LEFT ATRIUM SIZE: 4.43 CM
LEFT ATRIUM VOLUME INDEX MOD: 26 ML/M2
LEFT ATRIUM VOLUME INDEX: 42.3 ML/M2
LEFT ATRIUM VOLUME MOD: 61 CM3
LEFT ATRIUM VOLUME: 99.46 CM3
LEFT INTERNAL DIMENSION IN SYSTOLE: 3.48 CM (ref 2.1–4)
LEFT VENTRICLE DIASTOLIC VOLUME INDEX: 50 ML/M2
LEFT VENTRICLE DIASTOLIC VOLUME: 117.5 ML
LEFT VENTRICLE MASS INDEX: 79 G/M2
LEFT VENTRICLE SYSTOLIC VOLUME INDEX: 21.3 ML/M2
LEFT VENTRICLE SYSTOLIC VOLUME: 50.03 ML
LEFT VENTRICULAR INTERNAL DIMENSION IN DIASTOLE: 4.99 CM (ref 3.5–6)
LEFT VENTRICULAR MASS: 186.28 G
LV LATERAL E/E' RATIO: 5.1 M/S
LV SEPTAL E/E' RATIO: 8.5 M/S
LVOT MG: 2.31 MMHG
LVOT MV: 0.7 CM/S
MV PEAK A VEL: 0.8 M/S
MV PEAK E VEL: 0.51 M/S
MV STENOSIS PRESSURE HALF TIME: 66.21 MS
MV VALVE AREA P 1/2 METHOD: 3.32 CM2
PISA MRMAX VEL: 3.43 M/S
PISA TR MAX VEL: 2.48 M/S
PULM VEIN S/D RATIO: 0.95
PV PEAK D VEL: 0.43 M/S
PV PEAK S VEL: 0.41 M/S
PV PEAK VELOCITY: 0.8 CM/S
RA MAJOR: 5.17 CM
RA PRESSURE: 3 MMHG
RA WIDTH: 4 CM
RIGHT VENTRICULAR END-DIASTOLIC DIMENSION: 3.84 CM
RV TISSUE DOPPLER FREE WALL SYSTOLIC VELOCITY 1 (APICAL 4 CHAMBER VIEW): 11 CM/S
SINUS: 3.5 CM
STJ: 2.49 CM
TDI LATERAL: 0.1 M/S
TDI SEPTAL: 0.06 M/S
TDI: 0.08 M/S
TR MAX PG: 25 MMHG
TRICUSPID ANNULAR PLANE SYSTOLIC EXCURSION: 1.7 CM
TV REST PULMONARY ARTERY PRESSURE: 28 MMHG

## 2023-01-31 PROCEDURE — 93306 TTE W/DOPPLER COMPLETE: CPT | Mod: 26,,, | Performed by: INTERNAL MEDICINE

## 2023-01-31 PROCEDURE — 93306 ECHO (CUPID ONLY): ICD-10-PCS | Mod: 26,,, | Performed by: INTERNAL MEDICINE

## 2023-01-31 PROCEDURE — 93227 HOLTER MONITOR - 48 HOUR (CUPID ONLY): ICD-10-PCS | Mod: ,,, | Performed by: INTERNAL MEDICINE

## 2023-01-31 PROCEDURE — 93226 XTRNL ECG REC<48 HR SCAN A/R: CPT

## 2023-01-31 PROCEDURE — 93227 XTRNL ECG REC<48 HR R&I: CPT | Mod: ,,, | Performed by: INTERNAL MEDICINE

## 2023-01-31 PROCEDURE — 93306 TTE W/DOPPLER COMPLETE: CPT

## 2023-02-03 LAB
OHS CV EVENT MONITOR DAY: 0
OHS CV HOLTER LENGTH DECIMAL HOURS: 48
OHS CV HOLTER LENGTH HOURS: 48
OHS CV HOLTER LENGTH MINUTES: 0
OHS CV HOLTER SINUS AVERAGE HR: 72
OHS CV HOLTER SINUS MAX HR: 122
OHS CV HOLTER SINUS MIN HR: 41

## 2023-02-08 DIAGNOSIS — F41.9 ANXIETY: ICD-10-CM

## 2023-02-08 DIAGNOSIS — E78.00 HYPERCHOLESTEROLEMIA: ICD-10-CM

## 2023-02-08 DIAGNOSIS — I10 ESSENTIAL HYPERTENSION: ICD-10-CM

## 2023-02-08 RX ORDER — IRBESARTAN 75 MG/1
75 TABLET ORAL NIGHTLY
Qty: 90 TABLET | Refills: 3 | Status: SHIPPED | OUTPATIENT
Start: 2023-02-08 | End: 2024-01-22

## 2023-02-08 RX ORDER — ATORVASTATIN CALCIUM 10 MG/1
10 TABLET, FILM COATED ORAL DAILY
Qty: 90 TABLET | Refills: 3 | Status: SHIPPED | OUTPATIENT
Start: 2023-02-08 | End: 2024-01-23

## 2023-02-08 RX ORDER — ESCITALOPRAM OXALATE 10 MG/1
10 TABLET ORAL DAILY
Qty: 90 TABLET | Refills: 3 | Status: SHIPPED | OUTPATIENT
Start: 2023-02-08 | End: 2024-01-22

## 2023-02-08 NOTE — TELEPHONE ENCOUNTER
Care Due:                  Date            Visit Type   Department     Provider  --------------------------------------------------------------------------------                                EP -                              PRIMARY      [facility] INTERNAL  Last Visit: 01-      CARE ([facility])   MEDICINE       Mickey Anton                              EP -                              PRIMARY      [facility] INTERNAL  Next Visit: 07-      CARE ([facility])   MEDICINE       Mickey Anton                                                            Last  Test          Frequency    Reason                     Performed    Due Date  --------------------------------------------------------------------------------    Lipid Panel.  12 months..  atorvastatin.............  01- 01-    Health [facility] Embedded Care Gaps. Reference number: [id number]. 2/08/2023   2:21:53 PM CST   Citizen of the [place]

## 2023-03-28 DIAGNOSIS — D68.51 FACTOR V LEIDEN: ICD-10-CM

## 2023-03-28 NOTE — TELEPHONE ENCOUNTER
----- Message from Gale Landis sent at 3/28/2023  8:28 AM CDT -----  Regarding: Refill Request  Who Called: FARSHAD PLASCENCIA [[id number]]          New Prescription or Refill : Refill      RX Name and Strength:  XARELTO 20 mg Tab       30 day or 90 day RX: 90      Preferred Pharmacy: [facility] - [place] - [address] [facility] AT [address] [facility]   Phone:  [phone]  Fax:  [phone]        Would the patient rather a call back or a response via [facility]? Call back        Best Call Back Number:   [phone]        Additional Information: Pharmacy has also faxed for approval

## 2023-03-29 ENCOUNTER — TELEPHONE (OUTPATIENT)
Dept: INTERNAL MEDICINE | Facility: CLINIC | Age: 58
End: 2023-03-29
Payer: COMMERCIAL

## 2023-03-29 NOTE — TELEPHONE ENCOUNTER
----- Message from Merlin Edwards sent at 3/29/2023  7:51 AM CDT -----  Regarding: Refill  Who Called: FARSHAD PLASCENCIA [[id number]]        Refill or New Rx: Refill        RX Name and Strength rivaroxaban (XARELTO) 20 mg Tab        Is this a 30 day or 90 day RX: 90        Preferred Pharmacy with phone number: [facility] - [place] - [facility] AT [address] TO [facility] [address]          Local or Mail Order: Mail          Would the patient rather a call back or a response via [facility]? Local        Best Call Back Number: [phone]        Additional Information:

## 2023-03-29 NOTE — TELEPHONE ENCOUNTER
----- Message from Merlin Edwards sent at 3/29/2023  7:52 AM CDT -----  Regarding: CAll BAck  Name of Who is Calling: FARSHAD PLASCENCIA [[id number]]              What is the request in detail: Pt requesting a call back about medication rivaroxaban (XARELTO) 20 mg Tab. Please assist              Can the clinic reply by [facility]: No              What Number to Call Back if not in [facility]: [phone]

## 2023-04-05 ENCOUNTER — CLINICAL SUPPORT (OUTPATIENT)
Dept: ENDOSCOPY | Facility: HOSPITAL | Age: 58
End: 2023-04-05
Attending: INTERNAL MEDICINE
Payer: COMMERCIAL

## 2023-04-05 ENCOUNTER — TELEPHONE (OUTPATIENT)
Dept: ENDOSCOPY | Facility: HOSPITAL | Age: 58
End: 2023-04-05

## 2023-04-05 DIAGNOSIS — Z12.11 COLON CANCER SCREENING: ICD-10-CM

## 2023-04-12 ENCOUNTER — OFFICE VISIT (OUTPATIENT)
Dept: SLEEP MEDICINE | Facility: CLINIC | Age: 58
End: 2023-04-12
Attending: INTERNAL MEDICINE
Payer: COMMERCIAL

## 2023-04-12 VITALS
BODY MASS INDEX: 30.56 KG/M2 | WEIGHT: 238.13 LBS | HEART RATE: 99 BPM | DIASTOLIC BLOOD PRESSURE: 78 MMHG | SYSTOLIC BLOOD PRESSURE: 122 MMHG | HEIGHT: 74 IN

## 2023-04-12 DIAGNOSIS — G47.30 SLEEP APNEA, UNSPECIFIED TYPE: Primary | ICD-10-CM

## 2023-04-12 DIAGNOSIS — G47.33 OSA (OBSTRUCTIVE SLEEP APNEA): ICD-10-CM

## 2023-04-12 PROCEDURE — 99204 PR OFFICE/OUTPT VISIT, NEW, LEVL IV, 45-59 MIN: ICD-10-PCS | Mod: S$GLB,,, | Performed by: INTERNAL MEDICINE

## 2023-04-12 PROCEDURE — 99999 PR PBB SHADOW E&M-EST. PATIENT-LVL III: CPT | Mod: PBBFAC,,, | Performed by: INTERNAL MEDICINE

## 2023-04-12 PROCEDURE — 99204 OFFICE O/P NEW MOD 45 MIN: CPT | Mod: S$GLB,,, | Performed by: INTERNAL MEDICINE

## 2023-04-12 PROCEDURE — 99999 PR PBB SHADOW E&M-EST. PATIENT-LVL III: ICD-10-PCS | Mod: PBBFAC,,, | Performed by: INTERNAL MEDICINE

## 2023-04-12 NOTE — PROGRESS NOTES
"Referred by Mickey Anton MD     NEW PATIENT VISIT    Anselmo Nava  is a pleasant 57 y.o. male  with PMH significant for Factor V, PE, HTN, HLD, preDM, anxiety who presents for loud snoring.     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 mutation    Heterozygous MTHFR mutation [id number]    History of pulmonary embolism    Chronic anticoagulation       Current Outpatient Medications:     atorvastatin (LIPITOR) 10 MG tablet, Take 1 tablet (10 mg total) by mouth once daily., Disp: 90 tablet, Rfl: 3    cyanocobalamin, vitamin B-12, 1,000 mcg TbSR, Take 1 tablet by mouth once daily., Disp: 90 each, Rfl: 2    EScitalopram oxalate (LEXAPRO) 10 MG tablet, Take 1 tablet (10 mg total) by mouth once daily., Disp: 90 tablet, Rfl: 3    folic acid (FOLVITE) 800 MCG Tab, Take 800 mcg by mouth once daily., Disp: , Rfl:     irbesartan (AVAPRO) 75 MG tablet, Take 1 tablet (75 mg total) by mouth every evening., Disp: 90 tablet, Rfl: 3    rivaroxaban (XARELTO) 20 mg Tab, Take 1 tablet (20 mg total) by mouth once daily., Disp: 90 tablet, Rfl: 3       Vitals:    04/12/23 1353   BP: 122/78   BP Location: Right arm   Patient Position: Sitting   BP Method: Medium (Automatic)   Pulse: 99   Weight: 108 kg (238 lb 1.6 oz)   Height: 6' 2" (1.88 m)     Physical Exam:    GEN:   Well-appearing  Psych:  Appropriate affect, demonstrates insight  SKIN:  No rash on the face or bridge of the nose      LABS:   Lab Results   Component Value Date    HGB 15.0 01/17/2023    CO2 25 01/17/2023       RECORDS REVIEWED PREVIOUSLY:    No prior sleep " testing.    ASSESSMENT    No flowsheet data found.  PROBLEM DESCRIPTION/ Sx on Presentation  STATUS   possible DARCIE   + loud snoring, + occasional snoring arousals, denies witnessed apneas     New   Daytime Sx   denies sleepiness when inactive   ESS n/a/24 on intake  New   Insomnia   Trouble falling asleep: denies  Maintenance:         wakes after 2 hours, a few hours to get back to sleep sometimes  Prior hypnotics:        Current hypnotics:     New   Other issues:     PLAN     -recommend sleep testing   -discussed trial therapy if DARCIE present and the patient is  open to a trial of CPAP therapy    RTC          The patient was given open opportunity to ask questions and/or express concerns about treatment plan.   All questions/concerns were discussed.     Two patient identifiers used prior to evaluation.

## 2023-04-18 ENCOUNTER — TELEPHONE (OUTPATIENT)
Dept: SLEEP MEDICINE | Facility: OTHER | Age: 58
End: 2023-04-18
Payer: COMMERCIAL

## 2023-04-24 ENCOUNTER — TELEPHONE (OUTPATIENT)
Dept: SLEEP MEDICINE | Facility: OTHER | Age: 58
End: 2023-04-24
Payer: COMMERCIAL

## 2023-04-25 ENCOUNTER — HOSPITAL ENCOUNTER (OUTPATIENT)
Dept: SLEEP MEDICINE | Facility: OTHER | Age: 58
Discharge: HOME | End: 2023-04-25
Attending: INTERNAL MEDICINE
Payer: COMMERCIAL

## 2023-04-25 DIAGNOSIS — G47.30 SLEEP APNEA, UNSPECIFIED TYPE: ICD-10-CM

## 2023-04-25 DIAGNOSIS — G47.33 OSA (OBSTRUCTIVE SLEEP APNEA): ICD-10-CM

## 2023-04-25 PROCEDURE — 95800 SLP STDY UNATTENDED: CPT

## 2023-04-26 ENCOUNTER — PATIENT MESSAGE (OUTPATIENT)
Dept: SLEEP MEDICINE | Facility: CLINIC | Age: 58
End: 2023-04-26

## 2023-04-26 PROBLEM — G47.33 OSA (OBSTRUCTIVE SLEEP APNEA): Status: ACTIVE | Noted: 2023-04-26

## 2023-04-26 PROCEDURE — 95806 PR SLEEP STUDY, UNATTENDED, SIMUL RECORD HR/O2 SAT/RESP FLOW/RESP EFFT: ICD-10-PCS | Mod: 26,,, | Performed by: INTERNAL MEDICINE

## 2023-04-26 PROCEDURE — 95806 SLEEP STUDY UNATT&RESP EFFT: CPT | Mod: 26,,, | Performed by: INTERNAL MEDICINE

## 2023-05-01 ENCOUNTER — TELEPHONE (OUTPATIENT)
Dept: SLEEP MEDICINE | Facility: CLINIC | Age: 58
End: 2023-05-01
Payer: COMMERCIAL

## 2023-05-01 NOTE — TELEPHONE ENCOUNTER
Staff contacted patient about their sleep study results, Patient would like to move forward with getting an order for CPAP machine. Patient will be contacted to be schedule for an appointment

## 2023-05-02 DIAGNOSIS — G47.33 OSA (OBSTRUCTIVE SLEEP APNEA): Primary | ICD-10-CM

## 2023-07-19 ENCOUNTER — PATIENT MESSAGE (OUTPATIENT)
Dept: INTERNAL MEDICINE | Facility: CLINIC | Age: 58
End: 2023-07-19

## 2023-07-19 ENCOUNTER — OFFICE VISIT (OUTPATIENT)
Dept: INTERNAL MEDICINE | Facility: CLINIC | Age: 58
End: 2023-07-19
Attending: INTERNAL MEDICINE
Payer: COMMERCIAL

## 2023-07-19 ENCOUNTER — LAB VISIT (OUTPATIENT)
Dept: LAB | Facility: OTHER | Age: 58
End: 2023-07-19
Attending: INTERNAL MEDICINE
Payer: COMMERCIAL

## 2023-07-19 VITALS
HEART RATE: 61 BPM | OXYGEN SATURATION: 94 % | WEIGHT: 242.06 LBS | BODY MASS INDEX: 31.07 KG/M2 | DIASTOLIC BLOOD PRESSURE: 86 MMHG | SYSTOLIC BLOOD PRESSURE: 136 MMHG | HEIGHT: 74 IN

## 2023-07-19 DIAGNOSIS — Z12.11 COLON CANCER SCREENING: ICD-10-CM

## 2023-07-19 DIAGNOSIS — G47.33 OSA (OBSTRUCTIVE SLEEP APNEA): ICD-10-CM

## 2023-07-19 DIAGNOSIS — I10 ESSENTIAL HYPERTENSION: ICD-10-CM

## 2023-07-19 DIAGNOSIS — R73.03 PRE-DIABETES: ICD-10-CM

## 2023-07-19 DIAGNOSIS — R73.03 PRE-DIABETES: Primary | ICD-10-CM

## 2023-07-19 LAB
ALBUMIN SERPL BCP-MCNC: 3.9 G/DL (ref 3.5–5.2)
ALP SERPL-CCNC: 61 U/L (ref 55–135)
ALT SERPL W/O P-5'-P-CCNC: 18 U/L (ref 10–44)
ANION GAP SERPL CALC-SCNC: 7 MMOL/L (ref 8–16)
AST SERPL-CCNC: 22 U/L (ref 10–40)
BILIRUB SERPL-MCNC: 0.4 MG/DL (ref 0.1–1)
BUN SERPL-MCNC: 10 MG/DL (ref 6–20)
CALCIUM SERPL-MCNC: 9.2 MG/DL (ref 8.7–10.5)
CHLORIDE SERPL-SCNC: 110 MMOL/L (ref 95–110)
CO2 SERPL-SCNC: 26 MMOL/L (ref 23–29)
CREAT SERPL-MCNC: 0.9 MG/DL (ref 0.5–1.4)
EST. GFR  (NO RACE VARIABLE): >60 ML/MIN/1.73 M^2
ESTIMATED AVG GLUCOSE: 114 MG/DL (ref 68–131)
GLUCOSE SERPL-MCNC: 98 MG/DL (ref 70–110)
HBA1C MFR BLD: 5.6 % (ref 4–5.6)
POTASSIUM SERPL-SCNC: 4.2 MMOL/L (ref 3.5–5.1)
PROT SERPL-MCNC: 6.7 G/DL (ref 6–8.4)
SODIUM SERPL-SCNC: 143 MMOL/L (ref 136–145)

## 2023-07-19 PROCEDURE — 99999 PR PBB SHADOW E&M-EST. PATIENT-LVL IV: ICD-10-PCS | Mod: PBBFAC,,, | Performed by: INTERNAL MEDICINE

## 2023-07-19 PROCEDURE — 99214 PR OFFICE/OUTPT VISIT, EST, LEVL IV, 30-39 MIN: ICD-10-PCS | Mod: S$GLB,,, | Performed by: INTERNAL MEDICINE

## 2023-07-19 PROCEDURE — 83036 HEMOGLOBIN GLYCOSYLATED A1C: CPT | Performed by: INTERNAL MEDICINE

## 2023-07-19 PROCEDURE — 80053 COMPREHEN METABOLIC PANEL: CPT | Performed by: INTERNAL MEDICINE

## 2023-07-19 PROCEDURE — 99999 PR PBB SHADOW E&M-EST. PATIENT-LVL IV: CPT | Mod: PBBFAC,,, | Performed by: INTERNAL MEDICINE

## 2023-07-19 PROCEDURE — 99214 OFFICE O/P EST MOD 30 MIN: CPT | Mod: S$GLB,,, | Performed by: INTERNAL MEDICINE

## 2023-07-19 PROCEDURE — 36415 COLL VENOUS BLD VENIPUNCTURE: CPT | Performed by: INTERNAL MEDICINE

## 2023-07-19 NOTE — PROGRESS NOTES
Subjective:       Patient ID: Anselmo Nava is a 58 y.o. male.    Chief Complaint: Hypertension    Here for routine 6 month f/u for HTN and preDM    58 yo M with PMHx of Factor V, PE, HTN, HLD, preDM    ### Factor V leiden- Hx PE ###  -Diagnosed PE about 5 years prior and sounds like he had thrombolysis done. -Follows with cardiologist Princess twice a year along with hematologist Dr. Cm Simental.   -On xarelto. Denies bleeding complications. Does report easy bruising but is not bothersome.       ### preDM ###              HGBA1C                   5.6                 01/25/2022 09:01 AM        HGBA1C                   5.6                 07/27/2021 09:05 AM        HGBA1C                   5.7 (H)             01/26/2021 10:33 AM        HGBA1C                   5.7 (H)             07/28/2020 09:22 AM        HGBA1C                   5.8 (H)             01/30/2020 08:43 AM        HGBA1C                   5.7 (H)             07/30/2019 09:13 AM        HGBA1C                   5.7 (H)             01/29/2019 09:08 AM     ### HTN ###  Irbesartan 75    ### HLD ###  Pt is tolerating statin without frequent muscle cramps or diffuse myalgias or weakness.    ### Unipolar depression  Controlled with Lexapro 10     ### HM ###  [facility]   7/7/15 EGD-ring in lower 1/3 esophagus c/ dilation; Erythema and congestion in antrum and duodenal bulb and irregular Z line consistent with esophagitis   Colonoscopy 3mm polkpy distal descending colon   Rec rpt in 5 yrs. Pt aware he is overdue and will contact GI to schedule. Denies unexplained weight loss, dysphagia or sensation of things sticking in throat, BRBPR, melena, night sweats.      ### partial tear of achilles   surrounding memorial day. Not complete. Dr Joe Parr orthopedist. Pt not interested in surgery. Pain is intermittent.            Review of Systems   Constitutional:  Negative for appetite change, chills, fever and unexpected weight change.   HENT:  Negative  "for hearing loss, sore throat and trouble swallowing.    Eyes:  Negative for visual disturbance.   Respiratory:  Negative for cough, chest tightness and shortness of breath.    Cardiovascular:  Negative for chest pain and leg swelling.   Gastrointestinal:  Negative for abdominal pain, blood in stool, constipation, diarrhea, nausea and vomiting.   Endocrine: Negative for polydipsia and polyuria.   Genitourinary:  Negative for decreased urine volume, difficulty urinating, dysuria, frequency and urgency.   Musculoskeletal:  Negative for gait problem.   Skin:  Negative for rash.   Neurological:  Negative for dizziness and numbness.   Psychiatric/Behavioral:  The patient is not nervous/anxious.      Objective:      Vitals:    07/19/23 0825   BP: 136/86   Pulse: 61   SpO2: (!) 94%   Weight: 109.8 kg (242 lb 1 oz)   Height: 6' 2" (1.88 m)      Physical Exam  Vitals and nursing note reviewed.   Constitutional:       General: He is not in acute distress.     Appearance: Normal appearance. He is well-developed.   HENT:      Head: Normocephalic and atraumatic.      Mouth/Throat:      Pharynx: No oropharyngeal exudate.   Eyes:      General: No scleral icterus.     Conjunctiva/sclera: Conjunctivae normal.      Pupils: Pupils are equal, round, and reactive to light.   Neck:      Thyroid: No thyromegaly.   Cardiovascular:      Rate and Rhythm: Normal rate and regular rhythm.      Heart sounds: Normal heart sounds. No murmur heard.  Pulmonary:      Effort: Pulmonary effort is normal.      Breath sounds: Normal breath sounds. No wheezing or rales.   Abdominal:      General: There is no distension.   Musculoskeletal:         General: No tenderness.   Lymphadenopathy:      Cervical: No cervical adenopathy.   Skin:     General: Skin is warm and dry.   Neurological:      Mental Status: He is alert and oriented to person, place, and time.   Psychiatric:         Behavior: Behavior normal.       Assessment:       1. Pre-diabetes    2. " Colon cancer screening    3. DARCIE (obstructive sleep apnea)    4. Essential hypertension        Plan:       Anselmo was seen today for hypertension.    Diagnoses and all orders for this visit:    Pre-diabetes  -     Hemoglobin A1C; Future    Colon cancer screening   Will schedule with bertin    DARCIE (obstructive sleep apnea)   Stressed adherence of and complications related to untreated DARCIE.    Essential hypertension  -     Comprehensive Metabolic Panel; Future               Mickey Savage MD  Internal Medicine-[facility]        Side effects of medication(s) were discussed in detail and patient voiced understanding.  Patient will call back for any issues or complications.

## 2024-01-21 DIAGNOSIS — I10 ESSENTIAL HYPERTENSION: ICD-10-CM

## 2024-01-21 DIAGNOSIS — F41.9 ANXIETY: ICD-10-CM

## 2024-01-21 DIAGNOSIS — E78.00 HYPERCHOLESTEROLEMIA: ICD-10-CM

## 2024-01-21 NOTE — TELEPHONE ENCOUNTER
Care Due:                  Date            Visit Type   Department     Provider  --------------------------------------------------------------------------------                                EP -                              PRIMARY      [facility] INTERNAL  Last Visit: 07-      CARE ([facility])   MEDICINE       Mickey Anton  Next Visit: None Scheduled  None         None Found                                                            Last  Test          Frequency    Reason                     Performed    Due Date  --------------------------------------------------------------------------------    Lipid Panel.  12 months..  atorvastatin.............  01- 01-    Health [facility] Embedded Care Due Messages. Reference number: [id number].   1/21/2024 7:13:03 AM CST

## 2024-01-22 RX ORDER — ESCITALOPRAM OXALATE 10 MG/1
10 TABLET ORAL
Qty: 90 TABLET | Refills: 1 | Status: SHIPPED | OUTPATIENT
Start: 2024-01-22

## 2024-01-22 RX ORDER — IRBESARTAN 75 MG/1
75 TABLET ORAL NIGHTLY
Qty: 90 TABLET | Refills: 1 | Status: SHIPPED | OUTPATIENT
Start: 2024-01-22

## 2024-01-22 NOTE — TELEPHONE ENCOUNTER
Refill Routing Note   Medication(s) are not appropriate for processing by [facility] for the following reason(s):        Required labs outdated    [facility] action(s):  Defer  Approve     Requires labs : Yes             Appointments  past 12m or future 3m with PCP    Date Provider   Last Visit   7/19/2023 Mickey Anton MD   Next Visit   Visit date not found Mickey Anton MD   ED visits in past 90 days: 0        Note composed:1:11 PM 01/22/2024

## 2024-01-23 RX ORDER — ATORVASTATIN CALCIUM 10 MG/1
10 TABLET, FILM COATED ORAL
Qty: 90 TABLET | Refills: 2 | Status: SHIPPED | OUTPATIENT
Start: 2024-01-23

## 2024-02-07 ENCOUNTER — PATIENT OUTREACH (OUTPATIENT)
Dept: ADMINISTRATIVE | Facility: HOSPITAL | Age: 59
End: 2024-02-07
Payer: COMMERCIAL

## 2024-03-10 DIAGNOSIS — D68.51 FACTOR V LEIDEN: ICD-10-CM

## 2024-03-11 RX ORDER — RIVAROXABAN 20 MG/1
20 TABLET, FILM COATED ORAL
Qty: 90 TABLET | Refills: 3 | Status: SHIPPED | OUTPATIENT
Start: 2024-03-11

## 2024-03-11 NOTE — TELEPHONE ENCOUNTER
Refill Routing Note   Medication(s) are not appropriate for processing by [facility] for the following reason(s):        Outside of protocol    [facility] action(s):  Route             Appointments  past 12m or future 3m with PCP    Date Provider   Last Visit   7/19/2023 Mickey Anton MD   Next Visit   Visit date not found Mickey Anton MD   ED visits in past 90 days: 0        Note composed:10:48 AM 03/11/2024

## 2024-03-25 ENCOUNTER — TELEPHONE (OUTPATIENT)
Dept: DERMATOLOGY | Facility: CLINIC | Age: 59
End: 2024-03-25
Payer: COMMERCIAL

## 2024-03-25 NOTE — TELEPHONE ENCOUNTER
NP is a referral from Dr. Cole with SCC on L cheek. Scheduled for same day mohs on 5/6 at 1130 am. Over the phone consult completed. Pt confirmed date, time, and location. New patient packet sent in the mail.

## 2024-05-06 ENCOUNTER — PROCEDURE VISIT (OUTPATIENT)
Dept: DERMATOLOGY | Facility: CLINIC | Age: 59
End: 2024-05-06
Payer: COMMERCIAL

## 2024-05-06 VITALS
HEART RATE: 75 BPM | SYSTOLIC BLOOD PRESSURE: 163 MMHG | BODY MASS INDEX: 31.07 KG/M2 | DIASTOLIC BLOOD PRESSURE: 101 MMHG | WEIGHT: 242.06 LBS | HEIGHT: 74 IN

## 2024-05-06 DIAGNOSIS — C44.329 SQUAMOUS CELL CARCINOMA OF SKIN OF LEFT CHEEK: Primary | ICD-10-CM

## 2024-05-06 PROCEDURE — 17312 MOHS ADDL STAGE: CPT | Mod: S$GLB,,, | Performed by: DERMATOLOGY

## 2024-05-06 PROCEDURE — 13132 CMPLX RPR F/C/C/M/N/AX/G/H/F: CPT | Mod: S$GLB,,, | Performed by: DERMATOLOGY

## 2024-05-06 PROCEDURE — 99499 UNLISTED E&M SERVICE: CPT | Mod: S$GLB,,, | Performed by: DERMATOLOGY

## 2024-05-06 PROCEDURE — 17311 MOHS 1 STAGE H/N/HF/G: CPT | Mod: 51,S$GLB,, | Performed by: DERMATOLOGY

## 2024-05-06 NOTE — PROGRESS NOTES
New patient with a 6-7 months h/o growth on the L cheek. Patient states it felt like a knot. Biopsy c/w SCC. No prior treatment. (+) Factor V Leiden, on Xarelto.    Physical Exam   HENT:   Head:         L medial cheek with a 5 x 5 mm pink bx site located 2.6 cm laterally from the left alar base.    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  Diagnosis, photograph, and pathology report were reviewed with patient.  Discussed risks, benefits, and alternatives of Mohs surgery.  Discussed repair options including complex closure, skin flap, skin graft, and second intention healing.  Patient agreed to proceed with Mohs surgery today.      PROCEDURE: Mohs' Micrographic Surgery    INDICATION: Location in non-mask areas of face where maximum conservation of tumor-free tissue is needed. Biopsy-proven skin cancer of cosmetically and functionally important areas, including head, neck, genital, hand, foot, or areas known for having difficulty in healing, such as the lower anterior legs. Tumor with ill-defined borders.    REFERRING PROVIDER: Shiloh Cole M.D.    CASE NUMBER:     ANESTHETIC: 4.5 cc 0.5% Lidocaine with Epi 1:200,000 mixed 1:1 with 0.5% Bupivacaine    SURGICAL PREP: Hibiclens    SURGEON: Fany Son MD    ASSISTANTS: Estrellita Veliz PA-C, Andra Rhodes MA, and Karol Hazel, Surg Tech    PREOPERATIVE DIAGNOSIS: squamous cell carcinoma- well differentiated    POSTOPERATIVE DIAGNOSIS: squamous cell carcinoma    PATHOLOGIC DIAGNOSIS: squamous cell carcinoma- well differentiated    HISTOLOGY OF SPECIMENS IN FIRST STAGE:   Tumor Type: Tumor seen. Invasive squamous cell carcinoma: Proliferation of squamous cells exhibiting atypia and infiltrating within the dermis.   Depth of Invasion: epidermis, dermis, and subcutaneous tissue  Perineural Invasion: No    HISTOLOGY OF SPECIMENS IN SUBSEQUENT STAGES:  Tumor Type:  No tumor seen.    STAGES OF MOHS' SURGERY PERFORMED:  2    TUMOR-FREE PLANE ACHIEVED: Yes    HEMOSTASIS: electrocoagulation     SPECIMENS: 4 (2 in stage A and 2 in stage B)    LOCATION: left cheek (medial). Location verified with Dr. Cole's clinical photograph. Patient also verified location with hand held mirror.    INITIAL LESION SIZE: 0.5 x 0.5 cm    FINAL DEFECT SIZE: 0.7 x 1.1 cm    WOUND REPAIR/DISPOSITION: The patient tolerated Mohs' Micrographic Surgery for a squamous cell carcinoma very well. When the tumor was completely removed, a repair of the surgical defect was undertaken.    PROCEDURE: Complex Linear Repair    INDICATION: Status post Mohs' Micrographic Surgery for squamous cell carcinoma.    CASE NUMBER:     SURGEON: Fany Son MD    ASSISTANTS: Kaya Monteiro    ANESTHETIC: 3 cc 1% Lidocaine with Epinephrine 1:100,000    SURGICAL PREP: Hibiclens, prepped by Kaya Monteiro    LOCATION: left cheek (medial)    DEFECT SIZE: 0.7 x 1.1 cm    WOUND REPAIR/DISPOSITION:  After the patient's carcinoma had been completely removed with Mohs' Micrographic Surgery, a repair of the surgical defect was undertaken. The patient was returned to the operating suite where the area of left medial cheek was prepped, draped, and anesthetized in the usual sterile fashion. The wound was widely undermined in all directions. The wound was undermined to a distance at least the maximum width of the defect as measured perpendicular to the closure line along at least one entire edge of the defect, in this case 2 cm. Then, electrocoagulation was used to obtain meticulous hemostasis. 5-0 Vicryl buried vertical mattress sutures were placed into the subcutaneous and dermal plane to close the wound and roberto the cutaneous wound edge. Bilateral dog ears were identified and were removed by a standard Burow's triangle technique. The cutaneous wound edges were closed using interrupted 5-0 Prolene suture.    The patient tolerated the procedure well.    The area  "was cleaned and dressed appropriately and the patient was given wound care instructions, as well as appointment for follow-up evaluation and suture removal in 7 days.    LENGTH OF REPAIR: 2.7 cm    Vitals:    05/06/24 1113 05/06/24 1356   BP: (!) 148/92 (!) 163/101   BP Location: Left arm    Patient Position: Sitting    BP Method: Small (Automatic)    Pulse: 75 (!) (P) 53   Weight: 109.8 kg (242 lb 1 oz)    Height: 6' 2" (1.88 m)          "

## 2024-05-13 ENCOUNTER — OFFICE VISIT (OUTPATIENT)
Dept: DERMATOLOGY | Facility: CLINIC | Age: 59
End: 2024-05-13
Payer: COMMERCIAL

## 2024-05-13 DIAGNOSIS — Z09 POSTOP CHECK: Primary | ICD-10-CM

## 2024-05-13 PROCEDURE — 99999 PR PBB SHADOW E&M-EST. PATIENT-LVL II: CPT | Mod: PBBFAC,,, | Performed by: DERMATOLOGY

## 2024-05-13 PROCEDURE — 99024 POSTOP FOLLOW-UP VISIT: CPT | Mod: S$GLB,,, | Performed by: DERMATOLOGY

## 2024-05-13 NOTE — PROGRESS NOTES
58 y.o. male patient is here for suture removal following Mohs' surgery.    Patient reports no problems.    WOUND PE:  The left medial cheek sutures intact. Wound healing well. Good skin edges. No signs or symptoms of infection.    IMPRESSION:  Healing operative site from Mohs' surgery. SCC, left medial cheek s/p Mohs with CLC postop day #7    PLAN:  Sutures removed today by Jia Haas MA. Steri-strips applied.  Continue wound care.  Keep moist with Aquaphor.  Call if any issues arise    RTC:  In 3-6 months with Dr. Shiloh Cole for skin check or sooner if new concern arises.

## 2024-07-12 DIAGNOSIS — I10 ESSENTIAL HYPERTENSION: ICD-10-CM

## 2024-07-12 RX ORDER — IRBESARTAN 75 MG/1
75 TABLET ORAL NIGHTLY
Qty: 90 TABLET | Refills: 0 | Status: SHIPPED | OUTPATIENT
Start: 2024-07-12

## 2024-07-12 NOTE — TELEPHONE ENCOUNTER
Care Due:                  Date            Visit Type   Department     Provider  --------------------------------------------------------------------------------                                EP -                              PRIMARY      [facility] INTERNAL  Last Visit: 07-      CARE ([facility])   MEDICINE       Mickey Anton  Next Visit: None Scheduled  None         None Found                                                            Last  Test          Frequency    Reason                     Performed    Due Date  --------------------------------------------------------------------------------    CMP.........  12 months..  atorvastatin, irbesartan.  07- 07-    Lipid Panel.  12 months..  atorvastatin.............  01- 01-    Health [facility] Embedded Care Due Messages. Reference number: [id number].   7/12/2024 1:13:14 AM CDT

## 2024-07-12 NOTE — TELEPHONE ENCOUNTER
Refill Routing Note   Medication(s) are not appropriate for processing by [facility] for the following reason(s):        Required vitals abnormal    [facility] action(s):  Defer     Requires labs : Yes             Appointments  past 12m or future 3m with PCP    Date Provider   Last Visit   7/19/2023 Mickey Anton MD   Next Visit   Visit date not found Mickey Anton MD   ED visits in past 90 days: 0        Note composed:8:10 AM 07/12/2024

## 2024-08-08 ENCOUNTER — OFFICE VISIT (OUTPATIENT)
Dept: INTERNAL MEDICINE | Facility: CLINIC | Age: 59
End: 2024-08-08
Attending: INTERNAL MEDICINE
Payer: COMMERCIAL

## 2024-08-08 ENCOUNTER — LAB VISIT (OUTPATIENT)
Dept: LAB | Facility: OTHER | Age: 59
End: 2024-08-08
Attending: INTERNAL MEDICINE
Payer: COMMERCIAL

## 2024-08-08 VITALS
SYSTOLIC BLOOD PRESSURE: 132 MMHG | HEART RATE: 60 BPM | HEIGHT: 74 IN | DIASTOLIC BLOOD PRESSURE: 88 MMHG | WEIGHT: 237.88 LBS | OXYGEN SATURATION: 98 % | BODY MASS INDEX: 30.53 KG/M2

## 2024-08-08 DIAGNOSIS — G47.33 OSA (OBSTRUCTIVE SLEEP APNEA): ICD-10-CM

## 2024-08-08 DIAGNOSIS — R73.03 PRE-DIABETES: ICD-10-CM

## 2024-08-08 DIAGNOSIS — Z12.5 PROSTATE CANCER SCREENING: ICD-10-CM

## 2024-08-08 DIAGNOSIS — Z79.01 CHRONIC ANTICOAGULATION: ICD-10-CM

## 2024-08-08 DIAGNOSIS — D68.51 FACTOR V LEIDEN: ICD-10-CM

## 2024-08-08 DIAGNOSIS — Z00.00 ANNUAL PHYSICAL EXAM: ICD-10-CM

## 2024-08-08 DIAGNOSIS — E78.00 HYPERCHOLESTEROLEMIA: ICD-10-CM

## 2024-08-08 DIAGNOSIS — I10 ESSENTIAL HYPERTENSION: ICD-10-CM

## 2024-08-08 DIAGNOSIS — Z86.711 HISTORY OF PULMONARY EMBOLISM: ICD-10-CM

## 2024-08-08 DIAGNOSIS — Z00.00 ANNUAL PHYSICAL EXAM: Primary | ICD-10-CM

## 2024-08-08 DIAGNOSIS — K22.2 ESOPHAGEAL RING: ICD-10-CM

## 2024-08-08 LAB
ALBUMIN SERPL BCP-MCNC: 4 G/DL (ref 3.5–5.2)
ALP SERPL-CCNC: 64 U/L (ref 55–135)
ALT SERPL W/O P-5'-P-CCNC: 23 U/L (ref 10–44)
ANION GAP SERPL CALC-SCNC: 8 MMOL/L (ref 8–16)
AST SERPL-CCNC: 23 U/L (ref 10–40)
BASOPHILS # BLD AUTO: 0.05 K/UL (ref 0–0.2)
BASOPHILS NFR BLD: 1.2 % (ref 0–1.9)
BILIRUB SERPL-MCNC: 0.6 MG/DL (ref 0.1–1)
BUN SERPL-MCNC: 15 MG/DL (ref 6–20)
CALCIUM SERPL-MCNC: 9.6 MG/DL (ref 8.7–10.5)
CHLORIDE SERPL-SCNC: 108 MMOL/L (ref 95–110)
CHOLEST SERPL-MCNC: 185 MG/DL (ref 120–199)
CHOLEST/HDLC SERPL: 2.6 {RATIO} (ref 2–5)
CO2 SERPL-SCNC: 25 MMOL/L (ref 23–29)
COMPLEXED PSA SERPL-MCNC: 0.62 NG/ML (ref 0–4)
CREAT SERPL-MCNC: 1 MG/DL (ref 0.5–1.4)
DIFFERENTIAL METHOD BLD: ABNORMAL
EOSINOPHIL # BLD AUTO: 0.1 K/UL (ref 0–0.5)
EOSINOPHIL NFR BLD: 1.5 % (ref 0–8)
ERYTHROCYTE [DISTWIDTH] IN BLOOD BY AUTOMATED COUNT: 13.8 % (ref 11.5–14.5)
EST. GFR  (NO RACE VARIABLE): >60 ML/MIN/1.73 M^2
ESTIMATED AVG GLUCOSE: 111 MG/DL (ref 68–131)
GLUCOSE SERPL-MCNC: 100 MG/DL (ref 70–110)
HBA1C MFR BLD: 5.5 % (ref 4–5.6)
HCT VFR BLD AUTO: 43.7 % (ref 40–54)
HDLC SERPL-MCNC: 71 MG/DL (ref 40–75)
HDLC SERPL: 38.4 % (ref 20–50)
HGB BLD-MCNC: 14.4 G/DL (ref 14–18)
IMM GRANULOCYTES # BLD AUTO: 0.03 K/UL (ref 0–0.04)
IMM GRANULOCYTES NFR BLD AUTO: 0.7 % (ref 0–0.5)
LDLC SERPL CALC-MCNC: 97.8 MG/DL (ref 63–159)
LYMPHOCYTES # BLD AUTO: 1.3 K/UL (ref 1–4.8)
LYMPHOCYTES NFR BLD: 32.5 % (ref 18–48)
MCH RBC QN AUTO: 33.2 PG (ref 27–31)
MCHC RBC AUTO-ENTMCNC: 33 G/DL (ref 32–36)
MCV RBC AUTO: 101 FL (ref 82–98)
MONOCYTES # BLD AUTO: 0.6 K/UL (ref 0.3–1)
MONOCYTES NFR BLD: 15.2 % (ref 4–15)
NEUTROPHILS # BLD AUTO: 2 K/UL (ref 1.8–7.7)
NEUTROPHILS NFR BLD: 48.9 % (ref 38–73)
NONHDLC SERPL-MCNC: 114 MG/DL
NRBC BLD-RTO: 0 /100 WBC
PLATELET # BLD AUTO: 211 K/UL (ref 150–450)
PMV BLD AUTO: 10.5 FL (ref 9.2–12.9)
POTASSIUM SERPL-SCNC: 4.8 MMOL/L (ref 3.5–5.1)
PROT SERPL-MCNC: 7.1 G/DL (ref 6–8.4)
RBC # BLD AUTO: 4.34 M/UL (ref 4.6–6.2)
SODIUM SERPL-SCNC: 141 MMOL/L (ref 136–145)
TRIGL SERPL-MCNC: 81 MG/DL (ref 30–150)
TSH SERPL DL<=0.005 MIU/L-ACNC: 2.9 UIU/ML (ref 0.4–4)
WBC # BLD AUTO: 4.09 K/UL (ref 3.9–12.7)

## 2024-08-08 PROCEDURE — 85025 COMPLETE CBC W/AUTO DIFF WBC: CPT | Performed by: INTERNAL MEDICINE

## 2024-08-08 PROCEDURE — 84443 ASSAY THYROID STIM HORMONE: CPT | Performed by: INTERNAL MEDICINE

## 2024-08-08 PROCEDURE — 99999 PR PBB SHADOW E&M-EST. PATIENT-LVL III: CPT | Mod: PBBFAC,,, | Performed by: INTERNAL MEDICINE

## 2024-08-08 PROCEDURE — 80061 LIPID PANEL: CPT | Performed by: INTERNAL MEDICINE

## 2024-08-08 PROCEDURE — 36415 COLL VENOUS BLD VENIPUNCTURE: CPT | Performed by: INTERNAL MEDICINE

## 2024-08-08 PROCEDURE — G2211 COMPLEX E/M VISIT ADD ON: HCPCS | Mod: S$GLB,,, | Performed by: INTERNAL MEDICINE

## 2024-08-08 PROCEDURE — 80053 COMPREHEN METABOLIC PANEL: CPT | Performed by: INTERNAL MEDICINE

## 2024-08-08 PROCEDURE — 84153 ASSAY OF PSA TOTAL: CPT | Performed by: INTERNAL MEDICINE

## 2024-08-08 PROCEDURE — 99214 OFFICE O/P EST MOD 30 MIN: CPT | Mod: S$GLB,,, | Performed by: INTERNAL MEDICINE

## 2024-08-08 PROCEDURE — 83036 HEMOGLOBIN GLYCOSYLATED A1C: CPT | Performed by: INTERNAL MEDICINE

## 2024-10-05 DIAGNOSIS — I10 ESSENTIAL HYPERTENSION: ICD-10-CM

## 2024-10-05 NOTE — TELEPHONE ENCOUNTER
No care due was identified.  [facility] Embedded Care Due Messages. Reference number: [id number].   10/05/2024 1:13:00 AM CDT

## 2024-10-06 RX ORDER — IRBESARTAN 75 MG/1
75 TABLET ORAL NIGHTLY
Qty: 90 TABLET | Refills: 3 | Status: SHIPPED | OUTPATIENT
Start: 2024-10-06

## 2024-10-06 NOTE — TELEPHONE ENCOUNTER
Refill Decision Note   Anselmo Nava  is requesting a refill authorization.  Brief Assessment and Rationale for Refill:  Approve     Medication Therapy Plan:         Comments:     Note composed:2:12 PM 10/06/2024

## 2024-10-07 DIAGNOSIS — E78.00 HYPERCHOLESTEROLEMIA: ICD-10-CM

## 2024-10-07 DIAGNOSIS — F41.9 ANXIETY: ICD-10-CM

## 2024-10-07 RX ORDER — ATORVASTATIN CALCIUM 10 MG/1
10 TABLET, FILM COATED ORAL DAILY
Qty: 90 TABLET | Refills: 2 | Status: SHIPPED | OUTPATIENT
Start: 2024-10-07

## 2024-10-07 RX ORDER — ESCITALOPRAM OXALATE 10 MG/1
10 TABLET ORAL DAILY
Qty: 90 TABLET | Refills: 1 | Status: SHIPPED | OUTPATIENT
Start: 2024-10-07

## 2024-10-07 NOTE — TELEPHONE ENCOUNTER
No care due was identified.  [facility] Embedded Care Due Messages. Reference number: [id number].   10/07/2024 4:45:48 PM CDT

## 2024-11-19 ENCOUNTER — OFFICE VISIT (OUTPATIENT)
Dept: INTERNAL MEDICINE | Facility: CLINIC | Age: 59
End: 2024-11-19
Attending: FAMILY MEDICINE
Payer: COMMERCIAL

## 2024-11-19 VITALS
HEIGHT: 74 IN | SYSTOLIC BLOOD PRESSURE: 116 MMHG | OXYGEN SATURATION: 98 % | BODY MASS INDEX: 29.85 KG/M2 | WEIGHT: 232.56 LBS | DIASTOLIC BLOOD PRESSURE: 72 MMHG | HEART RATE: 100 BPM

## 2024-11-19 DIAGNOSIS — I10 ESSENTIAL HYPERTENSION: ICD-10-CM

## 2024-11-19 DIAGNOSIS — M10.9 GOUT, UNSPECIFIED CAUSE, UNSPECIFIED CHRONICITY, UNSPECIFIED SITE: Primary | ICD-10-CM

## 2024-11-19 DIAGNOSIS — Z79.01 CHRONIC ANTICOAGULATION: ICD-10-CM

## 2024-11-19 PROCEDURE — 99214 OFFICE O/P EST MOD 30 MIN: CPT | Mod: S$GLB,,, | Performed by: FAMILY MEDICINE

## 2024-11-19 PROCEDURE — 99999 PR PBB SHADOW E&M-EST. PATIENT-LVL IV: CPT | Mod: PBBFAC,,, | Performed by: FAMILY MEDICINE

## 2024-11-19 PROCEDURE — G2211 COMPLEX E/M VISIT ADD ON: HCPCS | Mod: S$GLB,,, | Performed by: FAMILY MEDICINE

## 2024-11-19 RX ORDER — COLCHICINE 0.6 MG/1
0.6 TABLET ORAL 2 TIMES DAILY
Qty: 30 TABLET | Refills: 1 | Status: SHIPPED | OUTPATIENT
Start: 2024-11-19

## 2024-11-19 RX ORDER — METHYLPREDNISOLONE 4 MG/1
TABLET ORAL
Qty: 21 EACH | Refills: 0 | Status: SHIPPED | OUTPATIENT
Start: 2024-11-19 | End: 2024-12-10

## 2024-11-19 NOTE — PROGRESS NOTES
"CHIEF COMPLAINT: 5 days of darling toe and foot pain    HISTORY OF PRESENT ILLNESS: The patient presents with 5 days of darling toe and foot pain.  The patient has no history of gout.  There is no fever or chills associated with this.  There is no history of trauma.  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    Case is somewhat complicated as he is on lifelong Xarelto for PE.    The patient has a history of stable hypertension on current medications.  Patient denies chest pain or shortness of breath today.    REVIEW OF SYSTEMS:  GENERAL: No fever, chills, fatigability or weight loss.  SKIN: No rashes, itching or changes in color or texture of skin.  HEAD: No headaches or recent head trauma.  EYES: Visual acuity fine. No photophobia, ocular pain or diplopia.  EARS: Denies ear pain, discharge or vertigo.  NOSE: No loss of smell, no epistaxis or postnasal drip.  MOUTH & THROAT: No hoarseness or change in voice. No excessive gum bleeding.  NODES: Denies swollen glands.  CHEST: Denies SAHNI, cyanosis, wheezing, cough and sputum production.  CARDIOVASCULAR: Denies chest pain, PND, orthopnea or reduced exercise tolerance.  ABDOMEN: Appetite fine. No weight loss. Denies diarrhea, abdominal pain, hematemesis or blood in stool.  URINARY: No flank pain, dysuria or hematuria.  PERIPHERAL VASCULAR: No claudication or cyanosis.  MUSCULOSKELETAL: There is great toe joint stiffness and swelling. Denies back pain.  NEUROLOGIC: No history of seizures, paralysis, alteration of gait or coordination.    SOCIAL HISTORY:  He does not smoke.  He consumes alcohol socially.  He is .    PHYSICAL EXAMINATION:   Blood pressure 116/72, pulse 100, height 6' 2" (1.88 m), weight 105.5 kg (232 lb 9.4 oz), SpO2 98%.    APPEARANCE: Well nourished, well developed, in no acute distress.    HEAD: Normocephalic, atraumatic.  EYES: PERRL. EOMI.  Conjunctivae without injection and  " anicteric  NOSE: Mucosa pink. Airway clear.  MOUTH & THROAT: No tonsillar enlargement. No pharyngeal erythema or exudate. No stridor.  NECK: Supple.   NODES: No cervical, axillary or inguinal lymph node enlargement.  MUSCULOSKELETAL:  There is no clubbing, cyanosis, or edema of the extremities x4.  There is full range of motion of the lumbar spine.  There is full range of motion of the extremities x4.  There is marked tenderness of the great toe.  There is erythema and edema of the great toe as well.    NEUROLOGIC:       Normal speech development.      Hearing normal.      Antalgic gait.      Motor and sensory exams grossly normal.  PSYCHIATRIC: Patient is alert and oriented x3.  Thought processes are all normal.  There is no homicidality.  There is no suicidality.  There is no evidence of psychosis.    LABORATORY/RADIOLOGY: Chart reviewed.    ASSESSMENT:   5 days of darling toe and foot pain, most consistent with new onset gout  Xarelto for PE  HTN    PLAN:  Indocin not an option as he is on Xarelto for a PE years ago  Colchicine and medrol,   Patient will call with failure to improve and we will have him follow-up with Podiatry    Medrol and colchicine

## 2024-12-04 ENCOUNTER — OFFICE VISIT (OUTPATIENT)
Dept: PODIATRY | Facility: CLINIC | Age: 59
End: 2024-12-04
Attending: FAMILY MEDICINE
Payer: COMMERCIAL

## 2024-12-04 VITALS
WEIGHT: 232.56 LBS | HEIGHT: 74 IN | HEART RATE: 69 BPM | DIASTOLIC BLOOD PRESSURE: 93 MMHG | SYSTOLIC BLOOD PRESSURE: 160 MMHG | BODY MASS INDEX: 29.85 KG/M2

## 2024-12-04 DIAGNOSIS — M10.9 GOUT, UNSPECIFIED CAUSE, UNSPECIFIED CHRONICITY, UNSPECIFIED SITE: ICD-10-CM

## 2024-12-04 PROCEDURE — 99999 PR PBB SHADOW E&M-EST. PATIENT-LVL III: CPT | Mod: PBBFAC,,, | Performed by: PODIATRIST

## 2024-12-04 PROCEDURE — 99202 OFFICE O/P NEW SF 15 MIN: CPT | Mod: S$GLB,,, | Performed by: PODIATRIST

## 2024-12-04 NOTE — PROGRESS NOTES
Subjective:      Patient ID: Anselmo Nava is a 59 y.o. male.    Chief Complaint: Gout    Severe redness and pain gradual onset with rapid worsening over 2-3 day.  Falling wearing tight shoes or .  Patient saw primary care doctor warmth and had excellent resolution with oral medication for gout for few days.  Denies trauma and surgery.  Denies previous gouty attack.    Review of Systems   Constitutional: Negative for chills, diaphoresis, fever, malaise/fatigue and night sweats.   Cardiovascular:  Negative for claudication, cyanosis, leg swelling and syncope.   Skin:  Negative for color change, dry skin, nail changes, rash, suspicious lesions and unusual hair distribution.   Musculoskeletal:  Positive for gout and joint pain. Negative for falls, joint swelling, muscle cramps, muscle weakness and stiffness.   Gastrointestinal:  Negative for constipation, diarrhea, nausea and vomiting.   Neurological:  Negative for brief paralysis, disturbances in coordination, focal weakness, numbness, paresthesias, sensory change and tremors.           Objective:      Physical Exam  Cardiovascular:      Comments: DP and PT pulses 2/4 bilateral, capillary refill 3 seconds all toes/distal feet, all toes/both feet warm to touch.  Negative lymphadenopathy bilateral popliteal fossa and tarsal tunnel.  Negavie lower extremity edema bilateral.      Musculoskeletal:      Comments: No current pain to touch palpation range motion loading 1st MTPJ right and left.    Right and left feet have no deformity, loss of function, signs of acute trauma.   Skin:     Comments: Skin is normal age and health appropriate color, turgor, texture, and temperature bilateral lower extremities without ulceration, hyperpigmentation, discoloration, masses nodules or cords palpated.  No ecchymosis, erythema, edema, or cardinal signs of infection bilateral lower extremities.    Neurological:      Comments: Negative tinel sign to percussion sural, superficial  peroneal, deep peroneal, saphenous, and posterior tibial nerves right and left ankles and feet.            Assessment:       Encounter Diagnosis   Name Primary?    Gout, unspecified cause, unspecified chronicity, unspecified site          Plan:       Anselmo was seen today for gout.    Diagnoses and all orders for this visit:    Gout, unspecified cause, unspecified chronicity, unspecified site  -     Ambulatory referral/consult to Podiatry      I counseled the patient on his conditions, their implications and medical management.        Hydrate well 4 L water day.      Wear comfortable shoes with good arch supports.      Stretch calf muscles 3 times daily to help increase ankle dorsiflexion reduce loading of the big toe joint.      P.r.n.          No follow-ups on file.

## 2024-12-11 ENCOUNTER — TELEPHONE (OUTPATIENT)
Dept: INTERNAL MEDICINE | Facility: CLINIC | Age: 59
End: 2024-12-11
Payer: COMMERCIAL

## 2024-12-11 DIAGNOSIS — Z79.01 CHRONIC ANTICOAGULATION: ICD-10-CM

## 2024-12-11 DIAGNOSIS — Z86.711 HISTORY OF PULMONARY EMBOLISM: Primary | ICD-10-CM

## 2024-12-11 NOTE — TELEPHONE ENCOUNTER
[facility] sent a fax stating,    Pt Xarelto 20 mg is on manufacture backorder. Asking to prescribe an alternative. Please advise.

## 2025-01-28 DIAGNOSIS — Z86.711 HISTORY OF PULMONARY EMBOLISM: ICD-10-CM

## 2025-01-28 DIAGNOSIS — I10 ESSENTIAL HYPERTENSION: ICD-10-CM

## 2025-01-28 DIAGNOSIS — F41.9 ANXIETY: ICD-10-CM

## 2025-01-28 DIAGNOSIS — Z79.01 CHRONIC ANTICOAGULATION: ICD-10-CM

## 2025-01-28 RX ORDER — IRBESARTAN 75 MG/1
75 TABLET ORAL NIGHTLY
Qty: 90 TABLET | Refills: 3 | Status: SHIPPED | OUTPATIENT
Start: 2025-01-28 | End: 2025-02-10

## 2025-01-28 NOTE — TELEPHONE ENCOUNTER
----- Message from Korey sent at 1/28/2025 12:26 PM CST -----  Regarding: Blood Pressure  Name of Who is Calling:  Patient          What is the request in detail:  Please call patient he would like to have his BP refill, Patient does not know the name            Can the clinic reply by [facility]: No            What Number to Call Back if not in [facility]: [phone]

## 2025-01-29 RX ORDER — ESCITALOPRAM OXALATE 10 MG/1
10 TABLET ORAL DAILY
Qty: 90 TABLET | Refills: 1 | Status: SHIPPED | OUTPATIENT
Start: 2025-01-29

## 2025-01-29 NOTE — TELEPHONE ENCOUNTER
Care Due:                  Date            Visit Type   Department     Provider  --------------------------------------------------------------------------------                                EP -                              PRIMARY      [facility] INTERNAL  Jurgen Franco  Last Visit: 11-      CARE ([facility])   MEDICINE       Jud                              EP                               PRIMARY      [facility] INTERNAL  Next Visit: 02-      CARE ([facility])   MEDICINE       Mickey Anton                                                            Last  Test          Frequency    Reason                     Performed    Due Date  --------------------------------------------------------------------------------    Uric Acid...  12 months..  colchicine...............  Not Found    Overdue    [employer] Embedded Care Due Messages. Reference number: [id number].   1/29/2025 8:59:29 AM CST

## 2025-01-29 NOTE — TELEPHONE ENCOUNTER
----- Message from Mariam sent at 1/29/2025  8:47 AM CST -----  Type: RX Refill Request    Who called: Patient    Refill or New Rx: Refill    Rx name and Strength: EScitalopram oxalate (LEXAPRO) 10 MG tablet ;     Needs a new prescription ; it was lost in transit ; please     Would the patient rather a call back or a response via [facility]? Call    Best call back number: [phone]    Additional information:  [facility] [address] [facility], [facility]  [place] [address]  Phone: [phone] Fax: [phone]

## 2025-01-29 NOTE — TELEPHONE ENCOUNTER
Provider Staff:  Action required for this patient    Requires labs      Please see care gap opportunities below in Care Due Message.    Thanks!  [facility]     Appointments      Date Provider   Last Visit   8/8/2024 Mickey Anton MD   Next Visit   2/10/2025 Mickey Anton MD     Refill Decision Note   Anselmo Nava  is requesting a refill authorization.  Brief Assessment and Rationale for Refill:  Approve     Medication Therapy Plan:         Comments:     Note composed:9:06 AM 01/29/2025

## 2025-02-10 ENCOUNTER — PATIENT MESSAGE (OUTPATIENT)
Dept: INTERNAL MEDICINE | Facility: CLINIC | Age: 60
End: 2025-02-10

## 2025-02-10 ENCOUNTER — OFFICE VISIT (OUTPATIENT)
Dept: INTERNAL MEDICINE | Facility: CLINIC | Age: 60
End: 2025-02-10
Attending: INTERNAL MEDICINE
Payer: COMMERCIAL

## 2025-02-10 VITALS
WEIGHT: 240.94 LBS | SYSTOLIC BLOOD PRESSURE: 150 MMHG | OXYGEN SATURATION: 97 % | HEART RATE: 75 BPM | HEIGHT: 74 IN | DIASTOLIC BLOOD PRESSURE: 98 MMHG | BODY MASS INDEX: 30.92 KG/M2

## 2025-02-10 DIAGNOSIS — Z15.89 HETEROZYGOUS MTHFR MUTATION A1298C: ICD-10-CM

## 2025-02-10 DIAGNOSIS — D68.51 FACTOR V LEIDEN: ICD-10-CM

## 2025-02-10 DIAGNOSIS — Z79.01 CHRONIC ANTICOAGULATION: ICD-10-CM

## 2025-02-10 DIAGNOSIS — I10 ESSENTIAL HYPERTENSION: Primary | ICD-10-CM

## 2025-02-10 DIAGNOSIS — G47.33 OSA (OBSTRUCTIVE SLEEP APNEA): ICD-10-CM

## 2025-02-10 PROCEDURE — 99999 PR PBB SHADOW E&M-EST. PATIENT-LVL III: CPT | Mod: PBBFAC,,, | Performed by: INTERNAL MEDICINE

## 2025-02-10 PROCEDURE — 99214 OFFICE O/P EST MOD 30 MIN: CPT | Mod: S$GLB,,, | Performed by: INTERNAL MEDICINE

## 2025-02-10 PROCEDURE — G2211 COMPLEX E/M VISIT ADD ON: HCPCS | Mod: S$GLB,,, | Performed by: INTERNAL MEDICINE

## 2025-02-10 RX ORDER — IRBESARTAN 150 MG/1
150 TABLET ORAL NIGHTLY
Qty: 90 TABLET | Refills: 1 | Status: SHIPPED | OUTPATIENT
Start: 2025-02-10 | End: 2025-02-10 | Stop reason: SDUPTHER

## 2025-02-10 RX ORDER — IRBESARTAN 150 MG/1
150 TABLET ORAL NIGHTLY
Qty: 90 TABLET | Refills: 1 | Status: SHIPPED | OUTPATIENT
Start: 2025-02-10

## 2025-02-10 NOTE — PROGRESS NOTES
Subjective:       Patient ID: Anselmo Nava is a 59 y.o. male.    Chief Complaint: Hypertension (6 month f/u.)    Here for routine f/u    60 yo M with PMHx of Factor V, PE, HTN, HLD, preDM     BP elevated. Titrate to 150mg.     UTD with colo.    ### Factor V leiden- Hx PE ###  -Diagnosed PE about 5 years prior and sounds like he had thrombolysis done. -Follows with cardiologist Princess twice a year along with hematologist Dr. Cm Simental.   -On xarelto. Denies bleeding complications. Does report easy bruising but is not bothersome.      ### preDM ###                            HGBA1C                   5.5                 08/08/2024 09:12 AM        HGBA1C                   5.6                 07/19/2023 09:15 AM        HGBA1C                   5.6                 01/25/2022 09:01 AM        HGBA1C                   5.6                 07/27/2021 09:05 AM        HGBA1C                   5.7 (H)             01/26/2021 10:33 AM        HGBA1C                   5.7 (H)             07/28/2020 09:22 AM        HGBA1C                   5.8 (H)             01/30/2020 08:43 AM        HGBA1C                   5.7 (H)             07/30/2019 09:13 AM        HGBA1C                   5.7 (H)             01/29/2019 09:08 AM        ### HTN ###  Irbesartan 75  2/2025 double to 150mg     ### HLD ###  Pt is tolerating statin without frequent muscle cramps or diffuse myalgias or weakness.     ### Unipolar depression  Controlled with Lexapro 10      ### HM ###  [facility]   7/7/15 EGD-ring in lower 1/3 esophagus c/ dilation; Erythema and congestion in antrum and duodenal bulb and irregular Z line consistent with esophagitis   Colonoscopy 3mm polkpy distal descending colon        ### partial tear of achilles   surrounding memorial day. Not complete. Dr Joe Parr orthopedist. Pt not interested in surgery. Pain is intermittent.      ### SCC, left medial cheek s/p Mohs with Fany Velazquez MD at 5/13/2024       History of  "Present Illness              Review of Systems   Constitutional:  Negative for appetite change, chills, fever and unexpected weight change.   HENT:  Negative for hearing loss, sore throat and trouble swallowing.    Eyes:  Negative for visual disturbance.   Respiratory:  Negative for cough, chest tightness and shortness of breath.    Cardiovascular:  Negative for chest pain and leg swelling.   Gastrointestinal:  Negative for abdominal pain, blood in stool, constipation, diarrhea, nausea and vomiting.   Endocrine: Negative for polydipsia and polyuria.   Genitourinary:  Negative for decreased urine volume, difficulty urinating, dysuria, frequency and urgency.   Musculoskeletal:  Negative for gait problem.   Skin:  Negative for rash.   Neurological:  Negative for dizziness and numbness.   Psychiatric/Behavioral:  The patient is not nervous/anxious.        Objective:      Vitals:    02/10/25 1203   BP: (!) 150/98   BP Location: Left arm   Patient Position: Sitting   Pulse: 75   SpO2: 97%   Weight: 109.3 kg (240 lb 15.4 oz)   Height: 6' 2" (1.88 m)      Physical Exam  Vitals and nursing note reviewed.   Constitutional:       General: He is not in acute distress.     Appearance: Normal appearance. He is well-developed.   HENT:      Head: Normocephalic and atraumatic.      Mouth/Throat:      Pharynx: No oropharyngeal exudate.   Eyes:      General: No scleral icterus.     Conjunctiva/sclera: Conjunctivae normal.      Pupils: Pupils are equal, round, and reactive to light.   Neck:      Thyroid: No thyromegaly.   Cardiovascular:      Rate and Rhythm: Normal rate and regular rhythm.      Heart sounds: Normal heart sounds. No murmur heard.  Pulmonary:      Effort: Pulmonary effort is normal.      Breath sounds: Normal breath sounds. No wheezing or rales.   Abdominal:      General: There is no distension.   Musculoskeletal:         General: No tenderness.   Lymphadenopathy:      Cervical: No cervical adenopathy.   Skin:     " General: Skin is warm and dry.   Neurological:      Mental Status: He is alert and oriented to person, place, and time.   Psychiatric:         Behavior: Behavior normal.         Assessment:       1. Essential hypertension    2. Factor V Leiden    3. Heterozygous MTHFR mutation [id number]    4. DARCIE (obstructive sleep apnea)    5. Chronic anticoagulation        Plan:       Anselmo was seen today for hypertension.    Diagnoses and all orders for this visit:    Essential hypertension  -    INCREASE  irbesartan (AVAPRO) 150 MG tablet; Take 1 tablet (150 mg total) by mouth every evening.   Pt to keep log of home readings and will send me pic of log via [employer] in 2-4 weeks. Proper BP measuring techniques discussed.     Factor V Leiden   Controlled and asymptomatic.  Continue current Rx regimen.    Heterozygous MTHFR mutation [id number]   Controlled and asymptomatic.  Continue current Rx regimen.    DARCIE (obstructive sleep apnea)   Stressed adherence of and complications related to untreated DARCIE.    Chronic anticoagulation           Assessment & Plan            Visit today is associated with current or anticipated ongoing medical care related to this patient's single serious condition/complex condition of Hx PE, Factor V leiden, HTN. The patient will return to see me as these issues will be followed longitudinally.    This note was generated with the assistance of ambient listening technology. Verbal consent was obtained by the patient and accompanying visitor(s) for the recording of patient appointment to facilitate this note. I attest to having reviewed and edited the generated note for accuracy, though some syntax or spelling errors may persist. Please contact the author of this note for any clarification.      Mickey Savage MD  Internal Medicine-[facility]        Side effects of medication(s) were discussed in detail and patient voiced understanding.  Patient will call back for any issues or complications.

## 2025-02-26 DIAGNOSIS — D68.51 FACTOR V LEIDEN: ICD-10-CM

## 2025-02-26 RX ORDER — RIVAROXABAN 20 MG/1
20 TABLET, FILM COATED ORAL
Qty: 90 TABLET | Refills: 3 | Status: SHIPPED | OUTPATIENT
Start: 2025-02-26

## 2025-02-26 NOTE — TELEPHONE ENCOUNTER
Refill Routing Note   Medication(s) are not appropriate for processing by [facility] for the following reason(s):        Outside of protocol    [facility] action(s):  Route               Appointments  past 12m or future 3m with PCP    Date Provider   Last Visit   2/10/2025 Mickey Anton MD   Next Visit   8/11/2025 Mickey Anton MD   ED visits in past 90 days: 0        Note composed:10:31 AM 02/26/2025

## 2025-02-26 NOTE — TELEPHONE ENCOUNTER
Refill Encounter    PCP Visits: Recent Visits  Date Type Provider Dept   02/10/25 Office Visit Mickey Anton MD [facility] Internal Medicine   08/08/24 Office Visit Mickey Anton MD [facility] Internal Medicine   Showing recent visits within past 360 days and meeting all other requirements  Future Appointments  Date Type Provider Dept   08/11/25 Appointment Mickey Anton MD [facility] Internal Medicine   Showing future appointments within next 720 days and meeting all other requirements     Last 3 Blood Pressure:   BP Readings from Last 3 Encounters:   02/10/25 (!) 150/98   12/04/24 (!) 160/93   11/19/24 116/72     [facility] Pharm[facility]:   [facility] [address] [place] [facility] [address] [facility] [place] [address]  Phone: [phone] Fax: [phone]    Requested RX:  Requested Prescriptions     Pending Prescriptions Disp Refills    XARELTO 20 mg Tab [Pharmacy Med Name: XARELTO TAB 20MG] 90 tablet 3     Sig: TAKE 1 TABLET DAILY      RX Route: Normal

## 2025-03-31 ENCOUNTER — PATIENT MESSAGE (OUTPATIENT)
Dept: ADMINISTRATIVE | Facility: HOSPITAL | Age: 60
End: 2025-03-31
Payer: COMMERCIAL

## 2025-04-24 ENCOUNTER — TELEPHONE (OUTPATIENT)
Dept: INTERNAL MEDICINE | Facility: CLINIC | Age: 60
End: 2025-04-24
Payer: COMMERCIAL

## 2025-05-18 ENCOUNTER — RESULTS FOLLOW-UP (OUTPATIENT)
Dept: URGENT CARE | Facility: CLINIC | Age: 60
End: 2025-05-18

## 2025-05-18 ENCOUNTER — OFFICE VISIT (OUTPATIENT)
Dept: URGENT CARE | Facility: CLINIC | Age: 60
End: 2025-05-18
Payer: COMMERCIAL

## 2025-05-18 ENCOUNTER — TELEPHONE (OUTPATIENT)
Dept: URGENT CARE | Facility: CLINIC | Age: 60
End: 2025-05-18

## 2025-05-18 VITALS
HEIGHT: 74 IN | BODY MASS INDEX: 30.8 KG/M2 | DIASTOLIC BLOOD PRESSURE: 86 MMHG | SYSTOLIC BLOOD PRESSURE: 166 MMHG | OXYGEN SATURATION: 98 % | WEIGHT: 240 LBS | HEART RATE: 84 BPM | RESPIRATION RATE: 17 BRPM | TEMPERATURE: 99 F

## 2025-05-18 DIAGNOSIS — H65.193 ACUTE EFFUSION OF BOTH MIDDLE EARS: ICD-10-CM

## 2025-05-18 DIAGNOSIS — R82.4 KETONURIA: ICD-10-CM

## 2025-05-18 DIAGNOSIS — I10 ESSENTIAL HYPERTENSION: ICD-10-CM

## 2025-05-18 DIAGNOSIS — R10.9 ABDOMINAL PAIN, UNSPECIFIED ABDOMINAL LOCATION: Primary | ICD-10-CM

## 2025-05-18 DIAGNOSIS — R11.2 NAUSEA AND VOMITING, UNSPECIFIED VOMITING TYPE: ICD-10-CM

## 2025-05-18 PROBLEM — D68.59 HYPERCOAGULATION SYNDROME: Status: ACTIVE | Noted: 2018-05-08

## 2025-05-18 PROBLEM — Z86.0100 HISTORY OF COLONIC POLYPS: Status: ACTIVE | Noted: 2025-05-18

## 2025-05-18 PROBLEM — I26.09 ACUTE COR PULMONALE: Status: ACTIVE | Noted: 2025-05-18

## 2025-05-18 LAB
BILIRUBIN, UA POC OHS: NEGATIVE
BLOOD, UA POC OHS: NEGATIVE
CLARITY, UA POC OHS: CLEAR
COLOR, UA POC OHS: YELLOW
CTP QC/QA: YES
CTP QC/QA: YES
GLUCOSE, UA POC OHS: NEGATIVE
KETONES, UA POC OHS: 40
LEUKOCYTES, UA POC OHS: NEGATIVE
NITRITE, UA POC OHS: NEGATIVE
PH, UA POC OHS: 5.5
POC MOLECULAR INFLUENZA A AGN: NEGATIVE
POC MOLECULAR INFLUENZA B AGN: NEGATIVE
PROTEIN, UA POC OHS: NEGATIVE
SARS-COV+SARS-COV-2 AG RESP QL IA.RAPID: NEGATIVE
SPECIFIC GRAVITY, UA POC OHS: 1.02
UROBILINOGEN, UA POC OHS: 0.2

## 2025-05-18 PROCEDURE — 99214 OFFICE O/P EST MOD 30 MIN: CPT | Mod: S$GLB,,, | Performed by: PHYSICIAN ASSISTANT

## 2025-05-18 PROCEDURE — 87502 INFLUENZA DNA AMP PROBE: CPT | Mod: QW,S$GLB,, | Performed by: PHYSICIAN ASSISTANT

## 2025-05-18 PROCEDURE — 87811 SARS-COV-2 COVID19 W/OPTIC: CPT | Mod: QW,S$GLB,, | Performed by: PHYSICIAN ASSISTANT

## 2025-05-18 PROCEDURE — 81003 URINALYSIS AUTO W/O SCOPE: CPT | Mod: QW,S$GLB,, | Performed by: PHYSICIAN ASSISTANT

## 2025-05-18 PROCEDURE — 74019 RADEX ABDOMEN 2 VIEWS: CPT | Mod: S$GLB,,, | Performed by: RADIOLOGY

## 2025-05-18 RX ORDER — DICYCLOMINE HYDROCHLORIDE 20 MG/1
20 TABLET ORAL 3 TIMES DAILY PRN
Qty: 21 TABLET | Refills: 0 | Status: SHIPPED | OUTPATIENT
Start: 2025-05-18 | End: 2025-05-25

## 2025-05-18 RX ORDER — ONDANSETRON 8 MG/1
8 TABLET, FILM COATED ORAL EVERY 8 HOURS PRN
Qty: 9 TABLET | Refills: 0 | Status: SHIPPED | OUTPATIENT
Start: 2025-05-18 | End: 2025-05-21

## 2025-05-18 RX ORDER — ONDANSETRON 8 MG/1
8 TABLET, ORALLY DISINTEGRATING ORAL
Status: COMPLETED | OUTPATIENT
Start: 2025-05-18 | End: 2025-05-18

## 2025-05-18 RX ORDER — ALUMINUM HYDROXIDE, MAGNESIUM HYDROXIDE, AND SIMETHICONE 1200; 120; 1200 MG/30ML; MG/30ML; MG/30ML
30 SUSPENSION ORAL
Status: COMPLETED | OUTPATIENT
Start: 2025-05-18 | End: 2025-05-18

## 2025-05-18 RX ORDER — SIMETHICONE 180 MG
1 CAPSULE ORAL 4 TIMES DAILY
Qty: 28 EACH | Refills: 0 | Status: SHIPPED | OUTPATIENT
Start: 2025-05-18 | End: 2025-05-25

## 2025-05-18 RX ORDER — DICYCLOMINE HYDROCHLORIDE 20 MG/1
20 TABLET ORAL
Status: COMPLETED | OUTPATIENT
Start: 2025-05-18 | End: 2025-05-18

## 2025-05-18 RX ORDER — LIDOCAINE HYDROCHLORIDE 20 MG/ML
10 SOLUTION OROPHARYNGEAL
Status: COMPLETED | OUTPATIENT
Start: 2025-05-18 | End: 2025-05-18

## 2025-05-18 RX ADMIN — LIDOCAINE HYDROCHLORIDE 10 ML: 20 SOLUTION OROPHARYNGEAL at 03:05

## 2025-05-18 RX ADMIN — ONDANSETRON 8 MG: 8 TABLET, ORALLY DISINTEGRATING ORAL at 03:05

## 2025-05-18 RX ADMIN — DICYCLOMINE HYDROCHLORIDE 20 MG: 20 TABLET ORAL at 03:05

## 2025-05-18 RX ADMIN — ALUMINUM HYDROXIDE, MAGNESIUM HYDROXIDE, AND SIMETHICONE 30 ML: 1200; 120; 1200 SUSPENSION ORAL at 03:05

## 2025-05-18 NOTE — PROGRESS NOTES
"Subjective:      Patient ID: Anselmo Nava is a 59 y.o. male.    Vitals:  height is 6' 2" (1.88 m) and weight is 108.9 kg (240 lb). His oral temperature is 98.5 °F (36.9 °C). His blood pressure is 166/86 (abnormal) and his pulse is 84. His respiration is 17 and oxygen saturation is 98%.     Chief Complaint: GI Problem    59 y.o male c/o eating chicken around 11:30 am today and started feeling upper  abdominal pain. Patient reports that he unable to drinking anything due to feeling like he needs to vomit.  Patient states he took two OTC Tums which to help with the heartburn symptoms but no other relief.  Patient denies any sick contacts recently went out hunting camp with for the friends.  Patient's last bowel movement was  this morning and here at the office both normal  denies any blood or black stool.      GI Problem  The primary symptoms include abdominal pain, nausea, vomiting and myalgias. Primary symptoms do not include fever, fatigue, diarrhea, melena, hematemesis, hematochezia, dysuria, arthralgias or rash. The onset was sudden.   The illness does not include chills, anorexia, dysphagia, bloating, constipation or itching. Associated medical issues do not include inflammatory bowel disease, GERD, gallstones, liver disease, alcohol abuse, PUD, gastric bypass, bowel resection, irritable bowel syndrome, hemorrhoids or diverticulitis.       Constitution: Positive for appetite change. Negative for chills, sweating, fatigue and fever.   HENT:  Positive for postnasal drip. Negative for ear pain, ear discharge, mouth sores, tongue pain, tongue lesion, facial swelling, congestion, sinus pain, sinus pressure, sore throat, trouble swallowing and voice change.    Neck: Negative for neck pain and neck stiffness.   Cardiovascular:  Negative for chest pain.   Eyes:  Negative for eye discharge and eye itching.   Respiratory:  Positive for cough. Negative for sputum production and shortness of breath.    Gastrointestinal:  " Positive for abdominal pain, nausea, vomiting and heartburn. Negative for constipation, diarrhea, bright red blood in stool, dark colored stools and rectal bleeding.        Heartburn resolved   Genitourinary:  Negative for dysuria, frequency and urgency.   Musculoskeletal:  Positive for muscle ache. Negative for joint pain.   Skin:  Negative for rash.   Neurological:  Negative for dizziness and headaches.      Past Medical History:   Diagnosis Date    Anxiety        History reviewed. No pertinent surgical history.    Family History   Problem Relation Name Age of Onset    Lymphoma Mother      Depression Sister         Social History     Socioeconomic History    Marital status:    Tobacco Use    Smoking status: Never    Smokeless tobacco: Never   Substance and Sexual Activity    Alcohol use: Yes    Drug use: No    Sexual activity: Yes     Partners: Female     Social Drivers of Health     Financial Resource Strain: Low Risk  (1/27/2020)    Overall Financial Resource Strain (CARDIA)     Difficulty of Paying Living Expenses: Not hard at all   Food Insecurity: No Food Insecurity (1/27/2020)    Hunger Vital Sign     Worried About Running Out of Food in the Last Year: Never true     Ran Out of Food in the Last Year: Never true   Transportation Needs: No Transportation Needs (1/27/2020)    PRAPARE - Transportation     Lack of Transportation (Medical): No     Lack of Transportation (Non-Medical): No   Physical Activity: Unknown (7/18/2023)    Exercise Vital Sign     Days of Exercise per Week: Patient declined   Stress: No Stress Concern Present (1/27/2020)    Citizen of the [place] [employer] Occupational [employer] - Occupational Stress Questionnaire     Feeling of Stress : Only a little   Housing Stability: Unknown (7/18/2023)    Housing Stability Vital Sign     Unable to Pay for Housing in the Last Year: Patient refused     Unstable Housing in the Last Year: Patient refused       Current Medications[1]    Review of patient's  allergies indicates:  No Known Allergies    Objective:     Physical Exam   Constitutional:  Non-toxic appearance. He does not appear ill. No distress.   HENT:   Head: Normocephalic and atraumatic.   Ears:   Right Ear: External ear and ear canal normal. Tympanic membrane is not injected, not erythematous, not retracted and not bulging. A middle ear effusion is present. no impacted cerumen  Left Ear: External ear and ear canal normal. Tympanic membrane is not injected, not erythematous, not retracted and not bulging. A middle ear effusion is present. no impacted cerumen  Nose: Rhinorrhea present. No congestion.   Mouth/Throat: Mucous membranes are moist. No oropharyngeal exudate or posterior oropharyngeal erythema. Tonsils are 1+ on the right. Tonsils are 1+ on the left. No tonsillar exudate.   Eyes: Conjunctivae are normal. Right eye exhibits no discharge. Left eye exhibits no discharge. No scleral icterus.   Neck: Neck supple.   Cardiovascular: Normal rate, regular rhythm and normal heart sounds.   No murmur heard.Exam reveals no gallop and no friction rub.   Pulmonary/Chest: Breath sounds normal. No stridor. No respiratory distress. He has no wheezes. He has no rhonchi. He has no rales.   Abdominal: Normal appearance and bowel sounds are normal. He exhibits no distension and no mass. Soft. flat abdomen There is abdominal tenderness in the epigastric area. There is guarding. There is no rebound, no tenderness at McBurney's point, negative Tim's sign, no left CVA tenderness, negative Rovsing's sign and no right CVA tenderness. No hernia.   Musculoskeletal:      Cervical back: He exhibits no tenderness.   Lymphadenopathy:     He has no cervical adenopathy.   Neurological: He is alert.   Skin: Skin is warm, dry, not diaphoretic and no rash.   Psychiatric: His behavior is normal. Mood normal.   Nursing note and vitals reviewed.    Results for orders placed or performed in visit on 05/18/25   POCT  Urinalysis(Instrument)    Collection Time: 05/18/25  3:05 PM   Result Value Ref Range    Color, POC UA Yellow Yellow, Straw, Colorless    Clarity, POC UA Clear Clear    Glucose, POC UA Negative Negative    Bilirubin, POC UA Negative Negative    Ketones, POC UA 40 (A) Negative    Spec Grav POC UA 1.020 1.005 - 1.030    Blood, POC UA Negative Negative    pH, POC UA 5.5 5.0 - 8.0    Protein, POC UA Negative Negative    Urobilinogen, POC UA 0.2 <=1.0    Nitrite, POC UA Negative Negative    WBC, POC UA Negative Negative   POCT Influenza A/B MOLECULAR    Collection Time: 05/18/25  3:29 PM   Result Value Ref Range    POC Molecular Influenza A Ag Negative Negative    POC Molecular Influenza B Ag Negative Negative     Acceptable Yes    SARS Coronavirus 2 Antigen, POCT Manual Read    Collection Time: 05/18/25  3:29 PM   Result Value Ref Range    SARS Coronavirus 2 Antigen Negative Negative, Presumptive Negative     Acceptable Yes        Assessment:     1. Abdominal pain, unspecified abdominal location    2. Nausea and vomiting, unspecified vomiting type    3. Ketonuria    4. Essential hypertension    5. Acute effusion of both middle ears        Plan:       Abdominal pain, unspecified abdominal location  -     POCT Urinalysis(Instrument)  -     POCT Influenza A/B MOLECULAR  -     SARS Coronavirus 2 Antigen, POCT Manual Read  -     XR ABDOMEN FLAT AND ERECT  -     dicyclomine tablet 20 mg  -     LIDOcaine viscous HCl 2% oral solution 10 mL  -     aluminum-magnesium hydroxide-simethicone 200-200-20 mg/5 mL suspension 30 mL  -     dicyclomine (BENTYL) 20 mg tablet; Take 1 tablet (20 mg total) by mouth 3 (three) times daily as needed (spasms abdominal).  Dispense: 21 tablet; Refill: 0  -     simethicone 180 mg Cap; Take 1 capsule by mouth 4 (four) times daily. for 7 days  Dispense: 28 each; Refill: 0    Nausea and vomiting, unspecified vomiting type  -     ondansetron disintegrating tablet 8 mg  -      POCT Influenza A/B MOLECULAR  -     SARS Coronavirus 2 Antigen, POCT Manual Read  -     XR ABDOMEN FLAT AND ERECT  -     ondansetron (ZOFRAN) 8 MG tablet; Take 1 tablet (8 mg total) by mouth every 8 (eight) hours as needed for Nausea.  Dispense: 9 tablet; Refill: 0    Ketonuria    Essential hypertension    Acute effusion of both middle ears          Medical Decision Making:   Initial Assessment:   Abdominal pain unspecified  Differential Diagnosis:   Gastritis, ulcer peptic or duodenal, food-borne illness, flu, COVID, cholecystitis, appendicitis, bowel obstruction  Clinical Tests:   Lab Tests: Ordered and Reviewed       <> Summary of Lab: COVID flu urinalysis  Radiological Study: Ordered and Reviewed  Urgent Care Management:  GI cocktail on-site, abdominal x-ray, Zofran, Gas-X, Bentyl  Results reviewed  I have reviewed the patient chart and pertinent past imaging/labs.     Patient's blood pressure most likely elevated due to discomfort.  He felt much better after Zofran and GI cocktail.  Patient advised to can repeat testing in 24-48 hours with home test.  Still waiting on official x-ray results there was 1 location on the right lower quadrant that showed a fluid air level upright position discussed with patient that this is usually seen with bowel obstructions however it is low on my differential due to patient having 2 normal bowel movements today.  Discussed him going to the ER in the patient declined.  He understands that he is on strict ER precautions.  Wrote on patient's AVS over-the-counter Zyrtec as directed for fluid behind the ears    Patient Instructions   I will notify you of official x-ray results once available.  Use Zofran as needed for nausea.  Use Gas-X as directed for gas.  Use Bentyl as needed for abdominal spasms.  You have ketones in urine as we discussed.  You can follow up with your primary care for this.  Increase your water and eat a bland diet.  You were on strict ER precautions as we  discussed.  If your symptoms worsen or on an are unbearable please call 911 and go to the ER for evaluation.             [1]   Current Outpatient Medications   Medication Sig Dispense Refill    atorvastatin (LIPITOR) 10 MG tablet Take 1 tablet (10 mg total) by mouth once daily. 90 tablet 2    cyanocobalamin, vitamin B-12, 1,000 mcg TbSR Take 1 tablet by mouth once daily. 90 each 2    EScitalopram oxalate (LEXAPRO) 10 MG tablet Take 1 tablet (10 mg total) by mouth once daily. 90 tablet 1    folic acid (FOLVITE) 800 MCG Tab Take 800 mcg by mouth once daily.      irbesartan (AVAPRO) 150 MG tablet Take 1 tablet (150 mg total) by mouth every evening. 90 tablet 1    XARELTO 20 mg Tab TAKE 1 TABLET DAILY 90 tablet 3     Current Facility-Administered Medications   Medication Dose Route Frequency Provider Last Rate Last Admin    ondansetron disintegrating tablet 8 mg  8 mg Oral 1 time in Clinic/HOD Lou Damon PA-C

## 2025-05-18 NOTE — PATIENT INSTRUCTIONS
I will notify you of official x-ray results once available.  Use Zofran as needed for nausea.  Use Gas-X as directed for gas.  Use Bentyl as needed for abdominal spasms.  You have ketones in urine as we discussed.  You can follow up with your primary care for this.  Increase your water and eat a bland diet.  You were on strict ER precautions as we discussed.  If your symptoms worsen or on an are unbearable please call 911 and go to the ER for evaluation.

## 2025-05-20 ENCOUNTER — OFFICE VISIT (OUTPATIENT)
Dept: INTERNAL MEDICINE | Facility: CLINIC | Age: 60
End: 2025-05-20
Payer: COMMERCIAL

## 2025-05-20 ENCOUNTER — LAB VISIT (OUTPATIENT)
Dept: LAB | Facility: OTHER | Age: 60
End: 2025-05-20
Attending: NURSE PRACTITIONER
Payer: COMMERCIAL

## 2025-05-20 VITALS
BODY MASS INDEX: 30.03 KG/M2 | DIASTOLIC BLOOD PRESSURE: 80 MMHG | HEIGHT: 74 IN | HEART RATE: 105 BPM | OXYGEN SATURATION: 97 % | WEIGHT: 234 LBS | SYSTOLIC BLOOD PRESSURE: 117 MMHG

## 2025-05-20 DIAGNOSIS — R10.11 RIGHT UPPER QUADRANT ABDOMINAL PAIN: Primary | ICD-10-CM

## 2025-05-20 DIAGNOSIS — R10.11 RIGHT UPPER QUADRANT ABDOMINAL PAIN: ICD-10-CM

## 2025-05-20 LAB
ABSOLUTE EOSINOPHIL (OHS): 0 K/UL
ABSOLUTE MONOCYTE (OHS): 1.22 K/UL (ref 0.3–1)
ABSOLUTE NEUTROPHIL COUNT (OHS): 9.06 K/UL (ref 1.8–7.7)
ALBUMIN SERPL BCP-MCNC: 3.5 G/DL (ref 3.5–5.2)
ALP SERPL-CCNC: 81 UNIT/L (ref 40–150)
ALT SERPL W/O P-5'-P-CCNC: 21 UNIT/L (ref 10–44)
ANION GAP (OHS): 10 MMOL/L (ref 8–16)
AST SERPL-CCNC: 25 UNIT/L (ref 11–45)
BASOPHILS # BLD AUTO: 0.03 K/UL
BASOPHILS NFR BLD AUTO: 0.3 %
BILIRUB SERPL-MCNC: 1.1 MG/DL (ref 0.1–1)
BUN SERPL-MCNC: 9 MG/DL (ref 6–20)
CALCIUM SERPL-MCNC: 9.6 MG/DL (ref 8.7–10.5)
CHLORIDE SERPL-SCNC: 100 MMOL/L (ref 95–110)
CO2 SERPL-SCNC: 26 MMOL/L (ref 23–29)
CREAT SERPL-MCNC: 1 MG/DL (ref 0.5–1.4)
ERYTHROCYTE [DISTWIDTH] IN BLOOD BY AUTOMATED COUNT: 14.5 % (ref 11.5–14.5)
GFR SERPLBLD CREATININE-BSD FMLA CKD-EPI: >60 ML/MIN/1.73/M2
GLUCOSE SERPL-MCNC: 120 MG/DL (ref 70–110)
HCT VFR BLD AUTO: 43.6 % (ref 40–54)
HGB BLD-MCNC: 14.1 GM/DL (ref 14–18)
IMM GRANULOCYTES # BLD AUTO: 0.04 K/UL (ref 0–0.04)
IMM GRANULOCYTES NFR BLD AUTO: 0.4 % (ref 0–0.5)
LIPASE SERPL-CCNC: 10 U/L (ref 4–60)
LYMPHOCYTES # BLD AUTO: 1.06 K/UL (ref 1–4.8)
MCH RBC QN AUTO: 32.8 PG (ref 27–31)
MCHC RBC AUTO-ENTMCNC: 32.3 G/DL (ref 32–36)
MCV RBC AUTO: 101 FL (ref 82–98)
NUCLEATED RBC (/100WBC) (OHS): 0 /100 WBC
PLATELET # BLD AUTO: 161 K/UL (ref 150–450)
PMV BLD AUTO: 10.8 FL (ref 9.2–12.9)
POTASSIUM SERPL-SCNC: 4.1 MMOL/L (ref 3.5–5.1)
PROT SERPL-MCNC: 7.5 GM/DL (ref 6–8.4)
RBC # BLD AUTO: 4.3 M/UL (ref 4.6–6.2)
RELATIVE EOSINOPHIL (OHS): 0 %
RELATIVE LYMPHOCYTE (OHS): 9.3 % (ref 18–48)
RELATIVE MONOCYTE (OHS): 10.7 % (ref 4–15)
RELATIVE NEUTROPHIL (OHS): 79.3 % (ref 38–73)
SODIUM SERPL-SCNC: 136 MMOL/L (ref 136–145)
WBC # BLD AUTO: 11.41 K/UL (ref 3.9–12.7)

## 2025-05-20 PROCEDURE — 36415 COLL VENOUS BLD VENIPUNCTURE: CPT

## 2025-05-20 PROCEDURE — 99999 PR PBB SHADOW E&M-EST. PATIENT-LVL IV: CPT | Mod: PBBFAC,,, | Performed by: NURSE PRACTITIONER

## 2025-05-20 PROCEDURE — 84075 ASSAY ALKALINE PHOSPHATASE: CPT

## 2025-05-20 PROCEDURE — 85025 COMPLETE CBC W/AUTO DIFF WBC: CPT

## 2025-05-20 PROCEDURE — 86677 HELICOBACTER PYLORI ANTIBODY: CPT

## 2025-05-20 PROCEDURE — 83690 ASSAY OF LIPASE: CPT

## 2025-05-20 PROCEDURE — 99215 OFFICE O/P EST HI 40 MIN: CPT | Mod: S$GLB,,, | Performed by: NURSE PRACTITIONER

## 2025-05-20 RX ORDER — OMEPRAZOLE 20 MG/1
20 CAPSULE, DELAYED RELEASE ORAL DAILY
Qty: 14 CAPSULE | Refills: 0 | Status: SHIPPED | OUTPATIENT
Start: 2025-05-20 | End: 2025-06-03

## 2025-05-20 NOTE — PROGRESS NOTES
Subjective     Patient ID: Anselmo Nava is a 59 y.o. male.    Chief Complaint: Abdominal Pain and Shortness of Breath        Anselmo Nava is a 59 year old male that presents with a 5-day history of abdominal pain localized across the upper abdomen and right upper quadrant. Pain is described as sharp, rated 7/10, and worsens with palpation. He reports mild relief with Tums and Gaviscon. On Sunday, he experienced one episode of dry heaving with a small amount of emesis. His last bowel movement was normal. He was evaluated at urgent care on 5/18/25, where an abdominal X-ray (flat and upright) showed no acute abnormalities. He has not reported fever, jaundice, diarrhea, or urinary symptoms.      Abdominal Pain  This is a new problem. The current episode started in the past 7 days. The onset quality is sudden. The most recent episode lasted 24 hours. The problem has been gradually improving. The pain is located in the RUQ, LUQ and epigastric region. The pain is at a severity of 7/10. The pain is moderate. The quality of the pain is sharp. The abdominal pain does not radiate. Pertinent negatives include no anorexia, arthralgias, belching, constipation, diarrhea, dysuria, fever, flatus, frequency, headaches, hematochezia, hematuria, melena, myalgias, nausea, vomiting or weight loss. The pain is aggravated by deep breathing. Treatments tried: Bentyl, Pepto Bismal, Gaviscon. The treatment provided mild relief. There is no history of abdominal surgery, colon cancer, Crohn's disease, gallstones, GERD, irritable bowel syndrome, pancreatitis, PUD or ulcerative colitis. Patient's medical history does not include kidney stones and UTI.   Shortness of Breath  Associated symptoms include abdominal pain. Pertinent negatives include no fever, headaches or vomiting.         Review of Systems   Constitutional:  Negative for fever and weight loss.   Respiratory:  Positive for shortness of breath.    Gastrointestinal:  Positive for  abdominal pain. Negative for anorexia, constipation, diarrhea, flatus, hematochezia, melena, nausea and vomiting.   Genitourinary:  Negative for dysuria, frequency and hematuria.   Musculoskeletal:  Negative for arthralgias and myalgias.   Neurological:  Negative for headaches.          Objective     Physical Exam  Constitutional:       Appearance: Normal appearance. He is obese.   Cardiovascular:      Rate and Rhythm: Regular rhythm. Tachycardia present.   Pulmonary:      Effort: Pulmonary effort is normal. No respiratory distress.      Breath sounds: Normal breath sounds. No stridor. No wheezing, rhonchi or rales.   Chest:      Chest wall: No tenderness.   Abdominal:      General: There is distension.      Palpations: There is no mass.      Tenderness: There is abdominal tenderness. There is no right CVA tenderness, left CVA tenderness, guarding or rebound.      Hernia: No hernia is present.      Comments: +Tim's sign to right upper quadrant of abdomen   Musculoskeletal:         General: Normal range of motion.      Cervical back: Normal range of motion and neck supple.   Neurological:      Mental Status: He is alert and oriented to person, place, and time.   Psychiatric:         Mood and Affect: Mood normal.            Assessment and Plan     1. Right upper quadrant abdominal pain  -     US Abdominal Aorta; Future; Expected date: 05/20/2025  -     omeprazole (PRILOSEC) 20 MG capsule; Take 1 capsule (20 mg total) by mouth once daily. for 14 days  Dispense: 14 capsule; Refill: 0  -     CBC Auto Differential; Future; Expected date: 05/20/2025  -     Lipase; Future; Expected date: 05/20/2025  -     Comprehensive Metabolic Panel; Future; Expected date: 05/20/2025  -     H. pylori Antibody, IgG; Future; Expected date: 05/20/2025  -     Ambulatory referral/consult to Gastroenterology; Future; Expected date: 05/27/2025        F/U with PCP in 1 week or prn sooner.         No follow-ups on file.

## 2025-05-21 LAB — H PYLORI IGG SERPL QL IA: NEGATIVE

## 2025-05-23 ENCOUNTER — HOSPITAL ENCOUNTER (OUTPATIENT)
Dept: RADIOLOGY | Facility: HOSPITAL | Age: 60
Discharge: HOME | End: 2025-05-23
Attending: NURSE PRACTITIONER
Payer: COMMERCIAL

## 2025-05-23 DIAGNOSIS — R10.11 RIGHT UPPER QUADRANT ABDOMINAL PAIN: ICD-10-CM

## 2025-06-03 ENCOUNTER — HOSPITAL ENCOUNTER (OUTPATIENT)
Dept: RADIOLOGY | Facility: HOSPITAL | Age: 60
Discharge: HOME | End: 2025-06-03
Attending: INTERNAL MEDICINE
Payer: COMMERCIAL

## 2025-06-03 PROCEDURE — 76700 US EXAM ABDOM COMPLETE: CPT | Mod: TC

## 2025-06-03 PROCEDURE — 76700 US EXAM ABDOM COMPLETE: CPT | Mod: 26,,, | Performed by: RADIOLOGY

## 2025-06-11 ENCOUNTER — PATIENT OUTREACH (OUTPATIENT)
Dept: ADMINISTRATIVE | Facility: HOSPITAL | Age: 60
End: 2025-06-11
Payer: COMMERCIAL

## 2025-08-11 ENCOUNTER — LAB VISIT (OUTPATIENT)
Dept: LAB | Facility: OTHER | Age: 60
End: 2025-08-11
Attending: INTERNAL MEDICINE
Payer: COMMERCIAL

## 2025-08-11 DIAGNOSIS — R73.03 PRE-DIABETES: ICD-10-CM

## 2025-08-11 DIAGNOSIS — I10 ESSENTIAL HYPERTENSION: ICD-10-CM

## 2025-08-11 DIAGNOSIS — Z12.5 PROSTATE CANCER SCREENING: ICD-10-CM

## 2025-08-11 DIAGNOSIS — Z00.00 ANNUAL PHYSICAL EXAM: ICD-10-CM

## 2025-08-11 DIAGNOSIS — E78.00 HYPERCHOLESTEROLEMIA: ICD-10-CM

## 2025-08-11 DIAGNOSIS — G47.33 OSA (OBSTRUCTIVE SLEEP APNEA): ICD-10-CM

## 2025-08-11 DIAGNOSIS — Z79.01 CHRONIC ANTICOAGULATION: ICD-10-CM

## 2025-08-11 LAB
ABSOLUTE EOSINOPHIL (OHS): 0.07 K/UL
ABSOLUTE MONOCYTE (OHS): 0.71 K/UL (ref 0.3–1)
ABSOLUTE NEUTROPHIL COUNT (OHS): 3.63 K/UL (ref 1.8–7.7)
ALBUMIN SERPL BCP-MCNC: 4 G/DL (ref 3.5–5.2)
ALP SERPL-CCNC: 49 UNIT/L (ref 40–150)
ALT SERPL W/O P-5'-P-CCNC: 15 UNIT/L (ref 10–44)
ANION GAP (OHS): 14 MMOL/L (ref 8–16)
AST SERPL-CCNC: 23 UNIT/L (ref 11–45)
BASOPHILS # BLD AUTO: 0.06 K/UL
BASOPHILS NFR BLD AUTO: 0.8 %
BILIRUB SERPL-MCNC: 0.7 MG/DL (ref 0.1–1)
BUN SERPL-MCNC: 11 MG/DL (ref 8–23)
CALCIUM SERPL-MCNC: 9.1 MG/DL (ref 8.7–10.5)
CHLORIDE SERPL-SCNC: 107 MMOL/L (ref 95–110)
CHOLEST SERPL-MCNC: 200 MG/DL (ref 120–199)
CHOLEST/HDLC SERPL: 2.2 {RATIO} (ref 2–5)
CO2 SERPL-SCNC: 22 MMOL/L (ref 23–29)
CREAT SERPL-MCNC: 0.9 MG/DL (ref 0.5–1.4)
EAG (OHS): 108 MG/DL (ref 68–131)
ERYTHROCYTE [DISTWIDTH] IN BLOOD BY AUTOMATED COUNT: 14.6 % (ref 11.5–14.5)
FOLATE SERPL-MCNC: 14.3 NG/ML (ref 4–24)
GFR SERPLBLD CREATININE-BSD FMLA CKD-EPI: >60 ML/MIN/1.73/M2
GLUCOSE SERPL-MCNC: 102 MG/DL (ref 70–110)
HBA1C MFR BLD: 5.4 % (ref 4–5.6)
HCT VFR BLD AUTO: 42.1 % (ref 40–54)
HDLC SERPL-MCNC: 92 MG/DL (ref 40–75)
HDLC SERPL: 46 % (ref 20–50)
HGB BLD-MCNC: 13.8 GM/DL (ref 14–18)
IMM GRANULOCYTES # BLD AUTO: 0.04 K/UL (ref 0–0.04)
IMM GRANULOCYTES NFR BLD AUTO: 0.6 % (ref 0–0.5)
LDLC SERPL CALC-MCNC: 83 MG/DL (ref 63–159)
LYMPHOCYTES # BLD AUTO: 2.67 K/UL (ref 1–4.8)
MCH RBC QN AUTO: 32.6 PG (ref 27–31)
MCHC RBC AUTO-ENTMCNC: 32.8 G/DL (ref 32–36)
MCV RBC AUTO: 100 FL (ref 82–98)
NONHDLC SERPL-MCNC: 108 MG/DL
NUCLEATED RBC (/100WBC) (OHS): 0 /100 WBC
PLATELET # BLD AUTO: 237 K/UL (ref 150–450)
PMV BLD AUTO: 10.3 FL (ref 9.2–12.9)
POTASSIUM SERPL-SCNC: 3.8 MMOL/L (ref 3.5–5.1)
PROT SERPL-MCNC: 7.1 GM/DL (ref 6–8.4)
PSA SERPL-MCNC: 0.76 NG/ML
RBC # BLD AUTO: 4.23 M/UL (ref 4.6–6.2)
RELATIVE EOSINOPHIL (OHS): 1 %
RELATIVE LYMPHOCYTE (OHS): 37.2 % (ref 18–48)
RELATIVE MONOCYTE (OHS): 9.9 % (ref 4–15)
RELATIVE NEUTROPHIL (OHS): 50.5 % (ref 38–73)
SODIUM SERPL-SCNC: 143 MMOL/L (ref 136–145)
TRIGL SERPL-MCNC: 125 MG/DL (ref 30–150)
TSH SERPL-ACNC: 3.48 UIU/ML (ref 0.4–4)
VIT B12 SERPL-MCNC: 482 PG/ML (ref 210–950)
WBC # BLD AUTO: 7.18 K/UL (ref 3.9–12.7)

## 2025-08-11 PROCEDURE — 85025 COMPLETE CBC W/AUTO DIFF WBC: CPT

## 2025-08-11 PROCEDURE — 84443 ASSAY THYROID STIM HORMONE: CPT

## 2025-08-11 PROCEDURE — 36415 COLL VENOUS BLD VENIPUNCTURE: CPT

## 2025-08-11 PROCEDURE — 82746 ASSAY OF FOLIC ACID SERUM: CPT

## 2025-08-11 PROCEDURE — 82465 ASSAY BLD/SERUM CHOLESTEROL: CPT

## 2025-08-11 PROCEDURE — 82040 ASSAY OF SERUM ALBUMIN: CPT

## 2025-08-11 PROCEDURE — 82607 VITAMIN B-12: CPT

## 2025-08-11 PROCEDURE — 84153 ASSAY OF PSA TOTAL: CPT

## 2025-08-11 PROCEDURE — 83036 HEMOGLOBIN GLYCOSYLATED A1C: CPT
